# Patient Record
Sex: MALE | Race: WHITE | NOT HISPANIC OR LATINO | Employment: FULL TIME | URBAN - METROPOLITAN AREA
[De-identification: names, ages, dates, MRNs, and addresses within clinical notes are randomized per-mention and may not be internally consistent; named-entity substitution may affect disease eponyms.]

---

## 2019-02-28 LAB — HBA1C MFR BLD: 6.4 % OF TOTAL HGB

## 2019-03-05 ENCOUNTER — OFFICE VISIT (OUTPATIENT)
Dept: PRIMARY CARE CLINIC | Facility: CLINIC | Age: 59
End: 2019-03-05
Payer: COMMERCIAL

## 2019-03-05 VITALS
BODY MASS INDEX: 26.18 KG/M2 | DIASTOLIC BLOOD PRESSURE: 70 MMHG | WEIGHT: 166.81 LBS | OXYGEN SATURATION: 98 % | HEIGHT: 67 IN | SYSTOLIC BLOOD PRESSURE: 126 MMHG

## 2019-03-05 DIAGNOSIS — E11.9 TYPE 2 DIABETES MELLITUS WITHOUT COMPLICATION, WITHOUT LONG-TERM CURRENT USE OF INSULIN: Primary | ICD-10-CM

## 2019-03-05 DIAGNOSIS — I10 ESSENTIAL HYPERTENSION: ICD-10-CM

## 2019-03-05 PROBLEM — I73.9 PERIPHERAL VASCULAR DISEASE: Status: ACTIVE | Noted: 2018-09-18

## 2019-03-05 PROBLEM — Z87.442 HISTORY OF RENAL CALCULI: Status: ACTIVE | Noted: 2018-09-18

## 2019-03-05 PROBLEM — E78.2 MIXED HYPERLIPIDEMIA: Status: ACTIVE | Noted: 2019-03-05

## 2019-03-05 PROCEDURE — 3078F PR MOST RECENT DIASTOLIC BLOOD PRESSURE < 80 MM HG: ICD-10-PCS | Mod: CPTII,S$GLB,, | Performed by: FAMILY MEDICINE

## 2019-03-05 PROCEDURE — 99213 PR OFFICE/OUTPT VISIT, EST, LEVL III, 20-29 MIN: ICD-10-PCS | Mod: S$GLB,,, | Performed by: FAMILY MEDICINE

## 2019-03-05 PROCEDURE — 3008F PR BODY MASS INDEX (BMI) DOCUMENTED: ICD-10-PCS | Mod: CPTII,S$GLB,, | Performed by: FAMILY MEDICINE

## 2019-03-05 PROCEDURE — 99213 OFFICE O/P EST LOW 20 MIN: CPT | Mod: S$GLB,,, | Performed by: FAMILY MEDICINE

## 2019-03-05 PROCEDURE — 3074F PR MOST RECENT SYSTOLIC BLOOD PRESSURE < 130 MM HG: ICD-10-PCS | Mod: CPTII,S$GLB,, | Performed by: FAMILY MEDICINE

## 2019-03-05 PROCEDURE — 3078F DIAST BP <80 MM HG: CPT | Mod: CPTII,S$GLB,, | Performed by: FAMILY MEDICINE

## 2019-03-05 PROCEDURE — 3074F SYST BP LT 130 MM HG: CPT | Mod: CPTII,S$GLB,, | Performed by: FAMILY MEDICINE

## 2019-03-05 PROCEDURE — 3008F BODY MASS INDEX DOCD: CPT | Mod: CPTII,S$GLB,, | Performed by: FAMILY MEDICINE

## 2019-03-05 RX ORDER — AMLODIPINE BESYLATE 5 MG/1
5 TABLET ORAL DAILY
Refills: 3 | COMMUNITY
Start: 2019-02-18 | End: 2020-12-07 | Stop reason: SDUPTHER

## 2019-03-05 RX ORDER — ATORVASTATIN CALCIUM 40 MG/1
40 TABLET, FILM COATED ORAL DAILY
COMMUNITY
End: 2020-12-07 | Stop reason: SDUPTHER

## 2019-03-05 RX ORDER — ASPIRIN 81 MG/1
81 TABLET ORAL DAILY
Refills: 11 | COMMUNITY
Start: 2018-11-27 | End: 2019-05-30

## 2019-03-05 NOTE — PROGRESS NOTES
Subjective:       Patient ID: Mumtaz Levy is a 58 y.o. male.    Chief Complaint: Hypertension    Pt with newly dx'ed dm.  He has improved his diet after seeing the dietician.  He stopped sweet tea.  This has improved his glucose log.  We reviewed this in detail.  He has a1c pending.  He denies any se's from the tradjenta.  Also with hypertension.  He is currently on amlodipine.  He states his blood pressure has been good at home.  Also with renal insufficiency.  He saw Nephrology.  We reviewed his consult note.  He is happy with his current status and he has a follow-up appointment with blood work scheduled with him shortly.      Review of Systems   Constitutional: Negative for chills, fatigue, fever and unexpected weight change.   Eyes: Negative for visual disturbance.   Respiratory: Negative for shortness of breath.    Cardiovascular: Negative for chest pain, palpitations and leg swelling.   Gastrointestinal: Negative for abdominal pain, blood in stool, constipation, diarrhea, nausea and vomiting.   Genitourinary: Negative for dysuria and hematuria.   Musculoskeletal: Negative for arthralgias and back pain.   Skin: Negative for rash and wound.   Neurological: Negative for dizziness, tremors, syncope, weakness, light-headedness, numbness and headaches.   Hematological: Negative for adenopathy. Does not bruise/bleed easily.   Psychiatric/Behavioral: Negative for dysphoric mood. The patient is not nervous/anxious.        Objective:      Physical Exam   Constitutional: He appears well-developed and well-nourished.   HENT:   Head: Normocephalic and atraumatic.   Eyes: Conjunctivae and EOM are normal.   Neck: Neck supple. No thyromegaly present.   Cardiovascular: Normal rate, regular rhythm, normal heart sounds and intact distal pulses.   No murmur heard.  Pulmonary/Chest: Effort normal and breath sounds normal.   Abdominal: Soft. Bowel sounds are normal. He exhibits no mass. There is no tenderness. There is no  rebound and no guarding.   Musculoskeletal: Normal range of motion.   Neurological: He is alert.   Skin: Skin is dry.   Vitals reviewed.      Assessment:       1. Type 2 diabetes mellitus without complication, without long-term current use of insulin    2. Essential hypertension        Plan:       Type 2 diabetes mellitus without complication, without long-term current use of insulin    Essential hypertension              DISCUSSION:  After a thorough review of his blood glucose log and his A1c which is 6.4%, I recommended that he continue the diet and the medications.  His blood pressure is good and he is to continue to take the medication.  We will need blood work in the future.  I will see him in 6 months.  Continue follow-up with the nephrologist.

## 2019-03-12 DIAGNOSIS — E11.8 TYPE 2 DIABETES MELLITUS WITH COMPLICATION, WITHOUT LONG-TERM CURRENT USE OF INSULIN: Primary | ICD-10-CM

## 2019-03-15 ENCOUNTER — TELEPHONE (OUTPATIENT)
Dept: PRIMARY CARE CLINIC | Facility: CLINIC | Age: 59
End: 2019-03-15

## 2019-03-15 NOTE — TELEPHONE ENCOUNTER
----- Message from Navarro Thompson MD sent at 3/12/2019  3:23 PM CDT -----  Please let him know that his hemoglobin A1c is good at 6.4

## 2019-04-30 DIAGNOSIS — E11.9 TYPE 2 DIABETES MELLITUS WITHOUT COMPLICATION, WITHOUT LONG-TERM CURRENT USE OF INSULIN: Primary | ICD-10-CM

## 2019-05-10 DIAGNOSIS — E11.9 TYPE 2 DIABETES MELLITUS WITHOUT COMPLICATION, WITHOUT LONG-TERM CURRENT USE OF INSULIN: Primary | ICD-10-CM

## 2019-05-10 RX ORDER — LANCETS 33 GAUGE
1 EACH MISCELLANEOUS DAILY
COMMUNITY
End: 2019-05-10 | Stop reason: SDUPTHER

## 2019-05-13 RX ORDER — LANCETS 33 GAUGE
1 EACH MISCELLANEOUS DAILY
Qty: 300 EACH | Refills: 3 | Status: SHIPPED | OUTPATIENT
Start: 2019-05-13

## 2019-05-24 ENCOUNTER — TELEPHONE (OUTPATIENT)
Dept: PRIMARY CARE CLINIC | Facility: CLINIC | Age: 59
End: 2019-05-24

## 2019-05-24 NOTE — TELEPHONE ENCOUNTER
"Mr. Levy called to let me know that he is having shoulder pain that is going down his arm and he " needs help."     I called Mr. Levy back. He stated that it is on his right shoulder and radiating down his right arm. He did not hurt it. He stated that it did start suddenly.He denies any other symptoms at this time. He has not tried to take anything for this pain. I explained to him that Dr. Thompson is out of the office until [place] 3. I informed to try taking something for his pain like ibuprofen. I also told him to try something gas/ indigestion as this sometimes can cause severe pain.  He asked if it can cause it to radiate. I informed that it can. I told him to go to the ED or UC if the pain does not get any better and to keep us informed. I asked that he called back if his symptoms changed. He verbalized understanding and appreciation. He  did not have any other questions at this this time.   "

## 2019-05-30 ENCOUNTER — OFFICE VISIT (OUTPATIENT)
Dept: FAMILY MEDICINE | Facility: CLINIC | Age: 59
End: 2019-05-30
Payer: COMMERCIAL

## 2019-05-30 VITALS
SYSTOLIC BLOOD PRESSURE: 146 MMHG | HEART RATE: 70 BPM | OXYGEN SATURATION: 98 % | HEIGHT: 67 IN | DIASTOLIC BLOOD PRESSURE: 98 MMHG | BODY MASS INDEX: 26.53 KG/M2 | WEIGHT: 169 LBS

## 2019-05-30 DIAGNOSIS — M54.2 NECK PAIN ON RIGHT SIDE: ICD-10-CM

## 2019-05-30 DIAGNOSIS — M79.601 RIGHT ARM PAIN: ICD-10-CM

## 2019-05-30 DIAGNOSIS — M25.511 ACUTE PAIN OF RIGHT SHOULDER: Primary | ICD-10-CM

## 2019-05-30 PROCEDURE — 99214 OFFICE O/P EST MOD 30 MIN: CPT | Mod: S$GLB,,, | Performed by: NURSE PRACTITIONER

## 2019-05-30 PROCEDURE — 99214 PR OFFICE/OUTPT VISIT, EST, LEVL IV, 30-39 MIN: ICD-10-PCS | Mod: S$GLB,,, | Performed by: NURSE PRACTITIONER

## 2019-05-30 PROCEDURE — 3080F DIAST BP >= 90 MM HG: CPT | Mod: CPTII,S$GLB,, | Performed by: NURSE PRACTITIONER

## 2019-05-30 PROCEDURE — 3008F BODY MASS INDEX DOCD: CPT | Mod: CPTII,S$GLB,, | Performed by: NURSE PRACTITIONER

## 2019-05-30 PROCEDURE — 3080F PR MOST RECENT DIASTOLIC BLOOD PRESSURE >= 90 MM HG: ICD-10-PCS | Mod: CPTII,S$GLB,, | Performed by: NURSE PRACTITIONER

## 2019-05-30 PROCEDURE — 3077F SYST BP >= 140 MM HG: CPT | Mod: CPTII,S$GLB,, | Performed by: NURSE PRACTITIONER

## 2019-05-30 PROCEDURE — 3077F PR MOST RECENT SYSTOLIC BLOOD PRESSURE >= 140 MM HG: ICD-10-PCS | Mod: CPTII,S$GLB,, | Performed by: NURSE PRACTITIONER

## 2019-05-30 PROCEDURE — 3008F PR BODY MASS INDEX (BMI) DOCUMENTED: ICD-10-PCS | Mod: CPTII,S$GLB,, | Performed by: NURSE PRACTITIONER

## 2019-05-30 RX ORDER — TRAMADOL HYDROCHLORIDE 50 MG/1
50 TABLET ORAL EVERY 8 HOURS PRN
Qty: 20 TABLET | Refills: 0 | Status: SHIPPED | OUTPATIENT
Start: 2019-05-30 | End: 2021-11-10

## 2019-05-30 RX ORDER — NAPROXEN 500 MG/1
500 TABLET ORAL 2 TIMES DAILY
Qty: 30 TABLET | Refills: 0 | Status: SHIPPED | OUTPATIENT
Start: 2019-05-30 | End: 2019-06-14

## 2019-05-30 RX ORDER — ASPIRIN 81 MG/1
81 TABLET ORAL DAILY
COMMUNITY

## 2019-05-30 NOTE — PROGRESS NOTES
Subjective:       Patient ID: Mumtaz Levy is a 59 y.o. male.    Chief Complaint: Shoulder Pain (right)    Shoulder Pain    The pain is present in the neck, right hand, right arm and right shoulder. This is a new problem. The current episode started 1 to 4 weeks ago. There has been no history of extremity trauma. The problem occurs constantly (pain has become progressively more pain continuously in last 2 weeks). The problem has been rapidly worsening. The quality of the pain is described as burning, sharp and aching. The pain is at a severity of 10/10. The pain is severe. Associated symptoms include numbness and tingling. Pertinent negatives include no fever, headaches, inability to bear weight, itching, joint locking, joint swelling, limited range of motion, stiffness or visual symptoms. The symptoms are aggravated by activity. Treatments tried: message at  Message. The treatment provided no relief. Family history does not include arthritis. There is no history of diabetes, Injuries to Extremity or migraines.        Review of Systems   Constitutional: Negative for activity change, appetite change, chills, fatigue and fever.   Respiratory: Negative for apnea, cough, chest tightness, shortness of breath and wheezing.    Cardiovascular: Negative for chest pain, palpitations and leg swelling.   Gastrointestinal: Negative for abdominal distention, abdominal pain, blood in stool, constipation, diarrhea and vomiting.   Genitourinary: Negative for difficulty urinating, dysuria, flank pain, frequency and urgency.   Musculoskeletal: Positive for myalgias and neck pain. Negative for arthralgias, back pain, gait problem, joint swelling, neck stiffness and stiffness.        Right shoulder pain   Skin: Negative for color change, itching, pallor and rash.   Neurological: Positive for tingling and numbness. Negative for dizziness, tremors, syncope, weakness, light-headedness and headaches.   Hematological: Negative for  adenopathy. Does not bruise/bleed easily.   Psychiatric/Behavioral: Negative for agitation, confusion and sleep disturbance. The patient is not nervous/anxious.        Objective:      Physical Exam   Constitutional: He is oriented to person, place, and time. Vital signs are normal. He appears well-developed and well-nourished.   HENT:   Head: Normocephalic and atraumatic.   Mouth/Throat: Oropharynx is clear and moist.   Eyes: Pupils are equal, round, and reactive to light. Conjunctivae are normal.   Neck: Trachea normal and normal range of motion. Neck supple. Carotid bruit is not present.   Cardiovascular: Normal rate, regular rhythm and normal heart sounds.   Pulmonary/Chest: Effort normal and breath sounds normal.   Abdominal: Soft. Bowel sounds are normal.   Musculoskeletal: Normal range of motion.   ROM elicits pain in right side of posterior neck, but no palpable tenderness    ROM elicits pain in right posterior shoulder, but no palpable tenderness   Neurological: He is alert and oriented to person, place, and time.   Skin: Skin is warm, dry and intact.   Psychiatric: He has a normal mood and affect. His speech is normal and behavior is normal. Judgment normal.       Assessment:       1. Acute pain of right shoulder    2. Right arm pain    3. Neck pain on right side        Plan:       PROBLEM LIST     Mumtaz was seen today for shoulder pain.    Diagnoses and all orders for this visit:    Acute pain of right shoulder  -     X-ray Shoulder 2 or More Views Right; Future  -     X-ray Shoulder 2 or More Views Right  -     naproxen (NAPROSYN) 500 MG tablet; Take 1 tablet (500 mg total) by mouth 2 (two) times daily. for 15 days  -     traMADol (ULTRAM) 50 mg tablet; Take 1 tablet (50 mg total) by mouth every 8 (eight) hours as needed for Pain.    Right arm pain  -     X-Ray Cervical Spine 2 or 3 Views; Future  -     X-ray Shoulder 2 or More Views Right; Future  -     X-Ray Cervical Spine 2 or 3 Views  -     X-ray  Shoulder 2 or More Views Right    Neck pain on right side  -     X-Ray Cervical Spine 2 or 3 Views; Future  -     X-Ray Cervical Spine 2 or 3 Views  -     naproxen (NAPROSYN) 500 MG tablet; Take 1 tablet (500 mg total) by mouth 2 (two) times daily. for 15 days  -     traMADol (ULTRAM) 50 mg tablet; Take 1 tablet (50 mg total) by mouth every 8 (eight) hours as needed for Pain.    No recent injury that he can recall. Will get baseline imaging. Unclear of pain is originating from shoulder or neck. Given paresthesia complaints in right arm, I suspect this is a cervical issue. Start NSAID and RTC in 7-10 days for f/u.

## 2019-06-07 ENCOUNTER — OFFICE VISIT (OUTPATIENT)
Dept: FAMILY MEDICINE | Facility: CLINIC | Age: 59
End: 2019-06-07
Payer: COMMERCIAL

## 2019-06-07 VITALS
HEIGHT: 67 IN | SYSTOLIC BLOOD PRESSURE: 130 MMHG | BODY MASS INDEX: 26.61 KG/M2 | DIASTOLIC BLOOD PRESSURE: 82 MMHG | TEMPERATURE: 97 F | OXYGEN SATURATION: 98 % | WEIGHT: 169.56 LBS | HEART RATE: 66 BPM

## 2019-06-07 DIAGNOSIS — M25.511 ACUTE PAIN OF RIGHT SHOULDER: Primary | ICD-10-CM

## 2019-06-07 DIAGNOSIS — M79.601 RIGHT ARM PAIN: ICD-10-CM

## 2019-06-07 DIAGNOSIS — M54.2 NECK PAIN ON RIGHT SIDE: ICD-10-CM

## 2019-06-07 DIAGNOSIS — I10 ESSENTIAL HYPERTENSION: ICD-10-CM

## 2019-06-07 DIAGNOSIS — E11.9 TYPE 2 DIABETES MELLITUS WITHOUT COMPLICATION, WITHOUT LONG-TERM CURRENT USE OF INSULIN: ICD-10-CM

## 2019-06-07 PROCEDURE — 99213 PR OFFICE/OUTPT VISIT, EST, LEVL III, 20-29 MIN: ICD-10-PCS | Mod: S$GLB,,, | Performed by: NURSE PRACTITIONER

## 2019-06-07 PROCEDURE — 3079F PR MOST RECENT DIASTOLIC BLOOD PRESSURE 80-89 MM HG: ICD-10-PCS | Mod: CPTII,S$GLB,, | Performed by: NURSE PRACTITIONER

## 2019-06-07 PROCEDURE — 3075F PR MOST RECENT SYSTOLIC BLOOD PRESS GE 130-139MM HG: ICD-10-PCS | Mod: CPTII,S$GLB,, | Performed by: NURSE PRACTITIONER

## 2019-06-07 PROCEDURE — 3044F HG A1C LEVEL LT 7.0%: CPT | Mod: CPTII,S$GLB,, | Performed by: NURSE PRACTITIONER

## 2019-06-07 PROCEDURE — 3044F PR MOST RECENT HEMOGLOBIN A1C LEVEL <7.0%: ICD-10-PCS | Mod: CPTII,S$GLB,, | Performed by: NURSE PRACTITIONER

## 2019-06-07 PROCEDURE — 99213 OFFICE O/P EST LOW 20 MIN: CPT | Mod: S$GLB,,, | Performed by: NURSE PRACTITIONER

## 2019-06-07 PROCEDURE — 3008F BODY MASS INDEX DOCD: CPT | Mod: CPTII,S$GLB,, | Performed by: NURSE PRACTITIONER

## 2019-06-07 PROCEDURE — 3075F SYST BP GE 130 - 139MM HG: CPT | Mod: CPTII,S$GLB,, | Performed by: NURSE PRACTITIONER

## 2019-06-07 PROCEDURE — 3079F DIAST BP 80-89 MM HG: CPT | Mod: CPTII,S$GLB,, | Performed by: NURSE PRACTITIONER

## 2019-06-07 PROCEDURE — 3008F PR BODY MASS INDEX (BMI) DOCUMENTED: ICD-10-PCS | Mod: CPTII,S$GLB,, | Performed by: NURSE PRACTITIONER

## 2019-06-07 NOTE — PROGRESS NOTES
Subjective:       Patient ID: Mumtaz Levy is a 59 y.o. male.    Chief Complaint: shoulder pain    Shoulder Pain    The pain is present in the neck, right hand, right arm and right shoulder. This is a new problem. The current episode started 1 to 4 weeks ago. There has been no history of extremity trauma. The problem occurs constantly (pain has become progressively more pain continuously in last 2 weeks). The problem has been rapidly worsening. The quality of the pain is described as burning, sharp and aching. The pain is at a severity of 10/10. The pain is severe. Associated symptoms include numbness and tingling. Pertinent negatives include no fever, headaches, inability to bear weight, itching, joint locking, joint swelling, limited range of motion, stiffness or visual symptoms. The symptoms are aggravated by activity. Treatments tried: message at  Message. The treatment provided no relief. Family history does not include arthritis. There is no history of diabetes, Injuries to Extremity or migraines.        Review of Systems   Constitutional: Negative for activity change, appetite change, chills, fatigue and fever.   Respiratory: Negative for apnea, cough, chest tightness, shortness of breath and wheezing.    Cardiovascular: Negative for chest pain, palpitations and leg swelling.   Gastrointestinal: Negative for abdominal distention, abdominal pain, blood in stool, constipation, diarrhea and vomiting.   Genitourinary: Negative for difficulty urinating, dysuria, flank pain, frequency and urgency.   Musculoskeletal: Positive for myalgias and neck pain. Negative for arthralgias, back pain, gait problem, joint swelling, neck stiffness and stiffness.        Right shoulder pain   Skin: Negative for color change, itching, pallor and rash.   Neurological: Positive for tingling and numbness. Negative for dizziness, tremors, syncope, weakness, light-headedness and headaches.   Hematological: Negative for adenopathy.  Does not bruise/bleed easily.   Psychiatric/Behavioral: Negative for agitation, confusion and sleep disturbance. The patient is not nervous/anxious.        Objective:      Physical Exam   Constitutional: He is oriented to person, place, and time. Vital signs are normal. He appears well-developed and well-nourished.   HENT:   Head: Normocephalic and atraumatic.   Mouth/Throat: Oropharynx is clear and moist.   Eyes: Pupils are equal, round, and reactive to light. Conjunctivae are normal.   Neck: Trachea normal and normal range of motion. Neck supple. Carotid bruit is not present.   Cardiovascular: Normal rate, regular rhythm and normal heart sounds.   Pulmonary/Chest: Effort normal and breath sounds normal.   Abdominal: Soft. Bowel sounds are normal.   Musculoskeletal: Normal range of motion.   ROM elicits pain in right side of posterior neck, but no palpable tenderness    ROM elicits pain in right posterior shoulder, but no palpable tenderness   Neurological: He is alert and oriented to person, place, and time.   Skin: Skin is warm, dry and intact.   Psychiatric: He has a normal mood and affect. His speech is normal and behavior is normal. Judgment normal.       Assessment:       1. Acute pain of right shoulder    2. Right arm pain    3. Neck pain on right side    4. Type 2 diabetes mellitus without complication, without long-term current use of insulin    5. Essential hypertension        Plan:       PROBLEM KRIS Pandya was seen today for follow-up.    Diagnoses and all orders for this visit:    Acute pain of right shoulder  -     PT evaluation; Future    Right arm pain  -     PT evaluation; Future    Neck pain on right side  -     PT evaluation; Future    Type 2 diabetes mellitus without complication, without long-term current use of insulin    Essential hypertension    Both cervical xray and shoulder xray neg. Will continue NSAID and order PT. If no improvement w/ PT, MRI will be ordered.

## 2019-06-07 NOTE — PROGRESS NOTES
Subjective:       Patient ID: Mumtaz Levy is a 59 y.o. male.    Chief Complaint: No chief complaint on file.    HPI  Review of Systems    Objective:      Physical Exam    Assessment:       No diagnosis found.    Plan:       PROBLEM LIST     There are no diagnoses linked to this encounter.

## 2019-06-13 DIAGNOSIS — E11.9 TYPE 2 DIABETES MELLITUS WITHOUT COMPLICATION, WITHOUT LONG-TERM CURRENT USE OF INSULIN: ICD-10-CM

## 2019-06-13 RX ORDER — BLOOD-GLUCOSE METER
EACH MISCELLANEOUS
Refills: 0 | COMMUNITY
Start: 2019-03-13

## 2019-06-13 NOTE — TELEPHONE ENCOUNTER
Mr. Levy needs his  Linagliptin called into [facility] on [facility]. He has an apt on 09/11/2019 at 1000

## 2019-06-13 NOTE — TELEPHONE ENCOUNTER
"Mumtaz stated that he needs his " sugar medicine" filled. He has Linagliptin on file. I tried to call him to confirm this was the medication he needed but there was no answer.      According to his chart, the Linagliptin was called in on 04/30/2019 to [facility] for a 90 day refill with 3 additional refills. He should still have medication left.   "

## 2019-09-11 ENCOUNTER — OFFICE VISIT (OUTPATIENT)
Dept: PRIMARY CARE CLINIC | Facility: CLINIC | Age: 59
End: 2019-09-11
Payer: COMMERCIAL

## 2019-09-11 VITALS
WEIGHT: 168 LBS | OXYGEN SATURATION: 95 % | HEART RATE: 64 BPM | HEIGHT: 67 IN | SYSTOLIC BLOOD PRESSURE: 122 MMHG | RESPIRATION RATE: 14 BRPM | BODY MASS INDEX: 26.37 KG/M2 | DIASTOLIC BLOOD PRESSURE: 80 MMHG

## 2019-09-11 DIAGNOSIS — E78.2 MIXED HYPERLIPIDEMIA: ICD-10-CM

## 2019-09-11 DIAGNOSIS — I73.9 PERIPHERAL VASCULAR DISEASE: ICD-10-CM

## 2019-09-11 DIAGNOSIS — E11.9 TYPE 2 DIABETES MELLITUS WITHOUT COMPLICATION, WITHOUT LONG-TERM CURRENT USE OF INSULIN: ICD-10-CM

## 2019-09-11 DIAGNOSIS — I10 ESSENTIAL HYPERTENSION: Primary | ICD-10-CM

## 2019-09-11 DIAGNOSIS — Z12.11 SCREENING FOR MALIGNANT NEOPLASM OF COLON: ICD-10-CM

## 2019-09-11 PROCEDURE — 99214 PR OFFICE/OUTPT VISIT, EST, LEVL IV, 30-39 MIN: ICD-10-PCS | Mod: S$GLB,,, | Performed by: FAMILY MEDICINE

## 2019-09-11 PROCEDURE — 3008F PR BODY MASS INDEX (BMI) DOCUMENTED: ICD-10-PCS | Mod: CPTII,S$GLB,, | Performed by: FAMILY MEDICINE

## 2019-09-11 PROCEDURE — 3079F PR MOST RECENT DIASTOLIC BLOOD PRESSURE 80-89 MM HG: ICD-10-PCS | Mod: CPTII,S$GLB,, | Performed by: FAMILY MEDICINE

## 2019-09-11 PROCEDURE — 3044F HG A1C LEVEL LT 7.0%: CPT | Mod: CPTII,S$GLB,, | Performed by: FAMILY MEDICINE

## 2019-09-11 PROCEDURE — 99214 OFFICE O/P EST MOD 30 MIN: CPT | Mod: S$GLB,,, | Performed by: FAMILY MEDICINE

## 2019-09-11 PROCEDURE — 3044F PR MOST RECENT HEMOGLOBIN A1C LEVEL <7.0%: ICD-10-PCS | Mod: CPTII,S$GLB,, | Performed by: FAMILY MEDICINE

## 2019-09-11 PROCEDURE — 3079F DIAST BP 80-89 MM HG: CPT | Mod: CPTII,S$GLB,, | Performed by: FAMILY MEDICINE

## 2019-09-11 PROCEDURE — 3074F SYST BP LT 130 MM HG: CPT | Mod: CPTII,S$GLB,, | Performed by: FAMILY MEDICINE

## 2019-09-11 PROCEDURE — 3074F PR MOST RECENT SYSTOLIC BLOOD PRESSURE < 130 MM HG: ICD-10-PCS | Mod: CPTII,S$GLB,, | Performed by: FAMILY MEDICINE

## 2019-09-11 PROCEDURE — 3008F BODY MASS INDEX DOCD: CPT | Mod: CPTII,S$GLB,, | Performed by: FAMILY MEDICINE

## 2019-09-11 NOTE — PROGRESS NOTES
Subjective:       Patient ID: Mumtaz Levy is a 59 y.o. male.    Chief Complaint: Follow-up    Patient with peripheral arterial disease and femoral bypass last year along with newly diagnosed diabetes presents for follow-up of his chronic conditions.  He states he has been feeling well.  He still has not had his colonoscopy.  He states he saw a GI doctor but nothing was ever scheduled (?).  He has his glucose log and it actually looks fairly well.  He has made large changes in his diet.  He is active at work but does not exercise specifically.  He denies any problems from the surgery.  Also with hypertension.  This has been perfectly controlled on medications without side effects.  Also with mixed hyperlipidemia.  This has been well controlled on 40 mg of atorvastatin.  He also takes that for the above.  He states currently he is in his normal state of health.  His last blood work was 6 months ago.        Review of Systems   Constitutional: Negative for chills, fatigue, fever and unexpected weight change.   Eyes: Negative for visual disturbance.   Respiratory: Negative for shortness of breath.    Cardiovascular: Negative for chest pain, palpitations and leg swelling.   Gastrointestinal: Negative for abdominal pain, blood in stool, constipation, diarrhea, nausea and vomiting.   Genitourinary: Negative for dysuria and hematuria.   Musculoskeletal: Negative for arthralgias and back pain.   Skin: Negative for rash and wound.   Neurological: Negative for dizziness, tremors, syncope, weakness, light-headedness, numbness and headaches.   Hematological: Negative for adenopathy. Does not bruise/bleed easily.   Psychiatric/Behavioral: Negative for dysphoric mood. The patient is not nervous/anxious.      Medication List with Changes/Refills   Current Medications    AMLODIPINE (NORVASC) 5 MG TABLET    Take 5 mg by mouth once daily.    ASPIRIN 325 MG TABLET    Take 325 mg by mouth once daily.    ATORVASTATIN (LIPITOR) 40 MG  "TABLET    Take 40 mg by mouth once daily.    BLOOD SUGAR DIAGNOSTIC (BLOOD GLUCOSE TEST) STRP    Place 1 strip onto the skin once daily.    LANCETS (MICRO THIN LANCETS) 33 GAUGE MISC    1 lancet by Misc.(Non-Drug; Combo Route) route once daily.    LINAGLIPTIN (TRADJENTA) 5 MG TAB TABLET    Take 1 tablet (5 mg total) by mouth once daily.    TRAMADOL (ULTRAM) 50 MG TABLET    Take 1 tablet (50 mg total) by mouth every 8 (eight) hours as needed for Pain.    TRUE METRIX GLUCOSE METER MISC    USE TO TEST BLOOD SUGAR DAILY AS DIRECTED         Objective:      /80   Pulse 64   Resp 14   Ht 5' 7" (1.702 m)   Wt 76.2 kg (168 lb)   SpO2 95%   BMI 26.31 kg/m²   Estimated body mass index is 26.31 kg/m² as calculated from the following:    Height as of this encounter: 5' 7" (1.702 m).    Weight as of this encounter: 76.2 kg (168 lb).  Physical Exam   Constitutional: He appears well-developed and well-nourished.   HENT:   Head: Normocephalic and atraumatic.   Eyes: Conjunctivae and EOM are normal.   Neck: Neck supple. No thyromegaly present.   Cardiovascular: Normal rate, regular rhythm, normal heart sounds and intact distal pulses.   No murmur heard.  Pulmonary/Chest: Effort normal and breath sounds normal.   Abdominal: Soft. Bowel sounds are normal. He exhibits no mass. There is no tenderness. There is no rebound and no guarding.   Musculoskeletal: Normal range of motion.   Neurological: He is alert.   Skin: Skin is dry.   Vitals reviewed.      Assessment:       Problem List Items Addressed This Visit        Cardiac/Vascular    Peripheral vascular disease    Mixed hyperlipidemia    Relevant Orders    Lipid panel    Essential hypertension - Primary    Relevant Orders    CBC auto differential    Comprehensive metabolic panel       Endocrine    Type 2 diabetes mellitus without complication, without long-term current use of insulin    Relevant Orders    Hemoglobin A1c            Plan:       Essential hypertension  -     " CBC auto differential; Future; Expected date: 09/11/2019  -     Comprehensive metabolic panel; Future; Expected date: 09/11/2019    Mixed hyperlipidemia  -     Lipid panel; Future; Expected date: 09/11/2019    Peripheral vascular disease    Type 2 diabetes mellitus without complication, without long-term current use of insulin  -     Hemoglobin A1c; Future; Expected date: 09/11/2019              DISCUSSION:  Get labs.  Cont meds.  Cont healthy diet and try to improve 5 % more.  Never had a c scope.  Yearly eye exam was good.

## 2019-09-12 LAB
ALBUMIN SERPL-MCNC: 4.4 G/DL (ref 3.6–5.1)
ALBUMIN/GLOB SERPL: 1.6 (CALC) (ref 1–2.5)
ALP SERPL-CCNC: 98 U/L (ref 40–115)
ALT SERPL-CCNC: 29 U/L (ref 9–46)
AST SERPL-CCNC: 15 U/L (ref 10–35)
BASOPHILS # BLD AUTO: 52 CELLS/UL (ref 0–200)
BASOPHILS NFR BLD AUTO: 0.6 %
BILIRUB SERPL-MCNC: 0.4 MG/DL (ref 0.2–1.2)
BUN SERPL-MCNC: 30 MG/DL (ref 7–25)
BUN/CREAT SERPL: 19 (CALC) (ref 6–22)
CALCIUM SERPL-MCNC: 10.1 MG/DL (ref 8.6–10.3)
CHLORIDE SERPL-SCNC: 105 MMOL/L (ref 98–110)
CHOLEST SERPL-MCNC: 121 MG/DL
CHOLEST/HDLC SERPL: 3.6 (CALC)
CO2 SERPL-SCNC: 26 MMOL/L (ref 20–32)
CREAT SERPL-MCNC: 1.57 MG/DL (ref 0.7–1.33)
EOSINOPHIL # BLD AUTO: 120 CELLS/UL (ref 15–500)
EOSINOPHIL NFR BLD AUTO: 1.4 %
ERYTHROCYTE [DISTWIDTH] IN BLOOD BY AUTOMATED COUNT: 12.8 % (ref 11–15)
GFRSERPLBLD MDRD-ARVRAT: 48 ML/MIN/1.73M2
GLOBULIN SER CALC-MCNC: 2.8 G/DL (CALC) (ref 1.9–3.7)
GLUCOSE SERPL-MCNC: 179 MG/DL (ref 65–139)
HBA1C MFR BLD: 6.7 % OF TOTAL HGB
HCT VFR BLD AUTO: 41.8 % (ref 38.5–50)
HDLC SERPL-MCNC: 34 MG/DL
HGB BLD-MCNC: 13.9 G/DL (ref 13.2–17.1)
LDLC SERPL CALC-MCNC: 63 MG/DL (CALC)
LYMPHOCYTES # BLD AUTO: 2743 CELLS/UL (ref 850–3900)
LYMPHOCYTES NFR BLD AUTO: 31.9 %
MCH RBC QN AUTO: 29.4 PG (ref 27–33)
MCHC RBC AUTO-ENTMCNC: 33.3 G/DL (ref 32–36)
MCV RBC AUTO: 88.4 FL (ref 80–100)
MONOCYTES # BLD AUTO: 628 CELLS/UL (ref 200–950)
MONOCYTES NFR BLD AUTO: 7.3 %
NEUTROPHILS # BLD AUTO: 5057 CELLS/UL (ref 1500–7800)
NEUTROPHILS NFR BLD AUTO: 58.8 %
NONHDLC SERPL-MCNC: 87 MG/DL (CALC)
PLATELET # BLD AUTO: 235 THOUSAND/UL (ref 140–400)
PMV BLD REES-ECKER: 9.9 FL (ref 7.5–12.5)
POTASSIUM SERPL-SCNC: 4.1 MMOL/L (ref 3.5–5.3)
PROT SERPL-MCNC: 7.2 G/DL (ref 6.1–8.1)
RBC # BLD AUTO: 4.73 MILLION/UL (ref 4.2–5.8)
SODIUM SERPL-SCNC: 141 MMOL/L (ref 135–146)
TRIGL SERPL-MCNC: 164 MG/DL
WBC # BLD AUTO: 8.6 THOUSAND/UL (ref 3.8–10.8)

## 2019-09-19 ENCOUNTER — TELEPHONE (OUTPATIENT)
Dept: PRIMARY CARE CLINIC | Facility: CLINIC | Age: 59
End: 2019-09-19

## 2019-09-19 NOTE — TELEPHONE ENCOUNTER
----- Message from Navarro Thompson MD sent at 9/19/2019  1:37 PM CDT -----  Please let him know that his kidneys are little deficient still.  Make sure he drinks enough water.  The rest of his blood work looks okay.  His blood sugar is a little bit higher but still in the good range.  Continue to eat healthy is diet that he can eat.  I would like for him to follow up in 6 months.

## 2020-04-03 DIAGNOSIS — E11.8 TYPE 2 DIABETES MELLITUS WITH COMPLICATION, WITHOUT LONG-TERM CURRENT USE OF INSULIN: ICD-10-CM

## 2020-04-06 RX ORDER — CALCIUM CITRATE/VITAMIN D3 200MG-6.25
TABLET ORAL
Qty: 300 STRIP | Refills: 3 | Status: SHIPPED | OUTPATIENT
Start: 2020-04-06 | End: 2021-05-11 | Stop reason: SDUPTHER

## 2020-12-09 RX ORDER — ATORVASTATIN CALCIUM 40 MG/1
40 TABLET, FILM COATED ORAL DAILY
Qty: 90 TABLET | Refills: 0 | Status: SHIPPED | OUTPATIENT
Start: 2020-12-09 | End: 2021-11-10

## 2020-12-09 RX ORDER — AMLODIPINE BESYLATE 5 MG/1
5 TABLET ORAL DAILY
Qty: 90 TABLET | Refills: 0 | Status: SHIPPED | OUTPATIENT
Start: 2020-12-09 | End: 2021-11-10

## 2021-03-31 ENCOUNTER — PATIENT OUTREACH (OUTPATIENT)
Dept: ADMINISTRATIVE | Facility: HOSPITAL | Age: 61
End: 2021-03-31

## 2021-04-08 ENCOUNTER — OFFICE VISIT (OUTPATIENT)
Dept: PRIMARY CARE CLINIC | Facility: CLINIC | Age: 61
End: 2021-04-08
Payer: COMMERCIAL

## 2021-04-08 VITALS
DIASTOLIC BLOOD PRESSURE: 78 MMHG | BODY MASS INDEX: 26.68 KG/M2 | OXYGEN SATURATION: 98 % | RESPIRATION RATE: 18 BRPM | HEIGHT: 67 IN | HEART RATE: 63 BPM | WEIGHT: 170 LBS | SYSTOLIC BLOOD PRESSURE: 136 MMHG

## 2021-04-08 DIAGNOSIS — Z00.00 WELLNESS EXAMINATION: Primary | ICD-10-CM

## 2021-04-08 DIAGNOSIS — E78.2 MIXED HYPERLIPIDEMIA: ICD-10-CM

## 2021-04-08 DIAGNOSIS — I10 ESSENTIAL HYPERTENSION: ICD-10-CM

## 2021-04-08 DIAGNOSIS — E11.9 TYPE 2 DIABETES MELLITUS WITHOUT COMPLICATION, WITHOUT LONG-TERM CURRENT USE OF INSULIN: ICD-10-CM

## 2021-04-08 DIAGNOSIS — I73.9 PERIPHERAL VASCULAR DISEASE: ICD-10-CM

## 2021-04-08 PROCEDURE — 99396 PR PREVENTIVE VISIT,EST,40-64: ICD-10-PCS | Mod: S$GLB,,, | Performed by: FAMILY MEDICINE

## 2021-04-08 PROCEDURE — 3008F BODY MASS INDEX DOCD: CPT | Mod: CPTII,S$GLB,, | Performed by: FAMILY MEDICINE

## 2021-04-08 PROCEDURE — 3008F PR BODY MASS INDEX (BMI) DOCUMENTED: ICD-10-PCS | Mod: CPTII,S$GLB,, | Performed by: FAMILY MEDICINE

## 2021-04-08 PROCEDURE — 99396 PREV VISIT EST AGE 40-64: CPT | Mod: S$GLB,,, | Performed by: FAMILY MEDICINE

## 2021-04-09 LAB
ALBUMIN SERPL-MCNC: 4.3 G/DL (ref 3.6–5.1)
ALBUMIN/GLOB SERPL: 1.4 (CALC) (ref 1–2.5)
ALP SERPL-CCNC: 77 U/L (ref 35–144)
ALT SERPL-CCNC: 33 U/L (ref 9–46)
AST SERPL-CCNC: 17 U/L (ref 10–35)
BASOPHILS # BLD AUTO: 39 CELLS/UL (ref 0–200)
BASOPHILS NFR BLD AUTO: 0.5 %
BILIRUB SERPL-MCNC: 0.5 MG/DL (ref 0.2–1.2)
BUN SERPL-MCNC: 27 MG/DL (ref 7–25)
BUN/CREAT SERPL: 18 (CALC) (ref 6–22)
CALCIUM SERPL-MCNC: 9.4 MG/DL (ref 8.6–10.3)
CHLORIDE SERPL-SCNC: 104 MMOL/L (ref 98–110)
CHOLEST SERPL-MCNC: 137 MG/DL
CHOLEST/HDLC SERPL: 3.9 (CALC)
CO2 SERPL-SCNC: 28 MMOL/L (ref 20–32)
CREAT SERPL-MCNC: 1.51 MG/DL (ref 0.7–1.25)
EOSINOPHIL # BLD AUTO: 94 CELLS/UL (ref 15–500)
EOSINOPHIL NFR BLD AUTO: 1.2 %
ERYTHROCYTE [DISTWIDTH] IN BLOOD BY AUTOMATED COUNT: 13.1 % (ref 11–15)
GFRSERPLBLD MDRD-ARVRAT: 49 ML/MIN/1.73M2
GLOBULIN SER CALC-MCNC: 3 G/DL (CALC) (ref 1.9–3.7)
GLUCOSE SERPL-MCNC: 123 MG/DL (ref 65–99)
HBA1C MFR BLD: 6.6 % OF TOTAL HGB
HCT VFR BLD AUTO: 43.9 % (ref 38.5–50)
HDLC SERPL-MCNC: 35 MG/DL
HGB BLD-MCNC: 15.3 G/DL (ref 13.2–17.1)
LDLC SERPL CALC-MCNC: 76 MG/DL (CALC)
LYMPHOCYTES # BLD AUTO: 2527 CELLS/UL (ref 850–3900)
LYMPHOCYTES NFR BLD AUTO: 32.4 %
MCH RBC QN AUTO: 30.8 PG (ref 27–33)
MCHC RBC AUTO-ENTMCNC: 34.9 G/DL (ref 32–36)
MCV RBC AUTO: 88.5 FL (ref 80–100)
MONOCYTES # BLD AUTO: 632 CELLS/UL (ref 200–950)
MONOCYTES NFR BLD AUTO: 8.1 %
NEUTROPHILS # BLD AUTO: 4508 CELLS/UL (ref 1500–7800)
NEUTROPHILS NFR BLD AUTO: 57.8 %
NONHDLC SERPL-MCNC: 102 MG/DL (CALC)
PLATELET # BLD AUTO: 231 THOUSAND/UL (ref 140–400)
PMV BLD REES-ECKER: 10.1 FL (ref 7.5–12.5)
POTASSIUM SERPL-SCNC: 4.3 MMOL/L (ref 3.5–5.3)
PROT SERPL-MCNC: 7.3 G/DL (ref 6.1–8.1)
RBC # BLD AUTO: 4.96 MILLION/UL (ref 4.2–5.8)
SODIUM SERPL-SCNC: 139 MMOL/L (ref 135–146)
TRIGL SERPL-MCNC: 162 MG/DL
TSH SERPL-ACNC: 3.89 MIU/L (ref 0.4–4.5)
WBC # BLD AUTO: 7.8 THOUSAND/UL (ref 3.8–10.8)

## 2021-04-13 ENCOUNTER — TELEPHONE (OUTPATIENT)
Dept: PRIMARY CARE CLINIC | Facility: CLINIC | Age: 61
End: 2021-04-13

## 2021-05-11 DIAGNOSIS — E11.8 TYPE 2 DIABETES MELLITUS WITH COMPLICATION, WITHOUT LONG-TERM CURRENT USE OF INSULIN: ICD-10-CM

## 2021-05-11 RX ORDER — CALCIUM CITRATE/VITAMIN D3 200MG-6.25
TABLET ORAL
Qty: 300 STRIP | Refills: 3 | Status: SHIPPED | OUTPATIENT
Start: 2021-05-11 | End: 2021-11-10 | Stop reason: SDUPTHER

## 2021-11-10 ENCOUNTER — OFFICE VISIT (OUTPATIENT)
Dept: PRIMARY CARE CLINIC | Facility: CLINIC | Age: 61
End: 2021-11-10
Payer: COMMERCIAL

## 2021-11-10 VITALS
HEART RATE: 76 BPM | OXYGEN SATURATION: 98 % | BODY MASS INDEX: 27 KG/M2 | DIASTOLIC BLOOD PRESSURE: 81 MMHG | RESPIRATION RATE: 18 BRPM | HEIGHT: 67 IN | WEIGHT: 172 LBS | SYSTOLIC BLOOD PRESSURE: 137 MMHG

## 2021-11-10 DIAGNOSIS — E11.9 TYPE 2 DIABETES MELLITUS WITHOUT COMPLICATION, WITHOUT LONG-TERM CURRENT USE OF INSULIN: Primary | ICD-10-CM

## 2021-11-10 DIAGNOSIS — E78.2 MIXED HYPERLIPIDEMIA: ICD-10-CM

## 2021-11-10 DIAGNOSIS — I10 ESSENTIAL HYPERTENSION: ICD-10-CM

## 2021-11-10 DIAGNOSIS — I73.9 PERIPHERAL VASCULAR DISEASE: ICD-10-CM

## 2021-11-10 PROCEDURE — 3079F DIAST BP 80-89 MM HG: CPT | Mod: CPTII,S$GLB,, | Performed by: FAMILY MEDICINE

## 2021-11-10 PROCEDURE — 3079F PR MOST RECENT DIASTOLIC BLOOD PRESSURE 80-89 MM HG: ICD-10-PCS | Mod: CPTII,S$GLB,, | Performed by: FAMILY MEDICINE

## 2021-11-10 PROCEDURE — 3008F BODY MASS INDEX DOCD: CPT | Mod: CPTII,S$GLB,, | Performed by: FAMILY MEDICINE

## 2021-11-10 PROCEDURE — 1160F RVW MEDS BY RX/DR IN RCRD: CPT | Mod: CPTII,S$GLB,, | Performed by: FAMILY MEDICINE

## 2021-11-10 PROCEDURE — 99214 PR OFFICE/OUTPT VISIT, EST, LEVL IV, 30-39 MIN: ICD-10-PCS | Mod: S$GLB,,, | Performed by: FAMILY MEDICINE

## 2021-11-10 PROCEDURE — 3075F SYST BP GE 130 - 139MM HG: CPT | Mod: CPTII,S$GLB,, | Performed by: FAMILY MEDICINE

## 2021-11-10 PROCEDURE — 99214 OFFICE O/P EST MOD 30 MIN: CPT | Mod: S$GLB,,, | Performed by: FAMILY MEDICINE

## 2021-11-10 PROCEDURE — 1160F PR REVIEW ALL MEDS BY PRESCRIBER/CLIN PHARMACIST DOCUMENTED: ICD-10-PCS | Mod: CPTII,S$GLB,, | Performed by: FAMILY MEDICINE

## 2021-11-10 PROCEDURE — 3044F PR MOST RECENT HEMOGLOBIN A1C LEVEL <7.0%: ICD-10-PCS | Mod: CPTII,S$GLB,, | Performed by: FAMILY MEDICINE

## 2021-11-10 PROCEDURE — 3075F PR MOST RECENT SYSTOLIC BLOOD PRESS GE 130-139MM HG: ICD-10-PCS | Mod: CPTII,S$GLB,, | Performed by: FAMILY MEDICINE

## 2021-11-10 PROCEDURE — 3044F HG A1C LEVEL LT 7.0%: CPT | Mod: CPTII,S$GLB,, | Performed by: FAMILY MEDICINE

## 2021-11-10 PROCEDURE — 3008F PR BODY MASS INDEX (BMI) DOCUMENTED: ICD-10-PCS | Mod: CPTII,S$GLB,, | Performed by: FAMILY MEDICINE

## 2021-11-10 PROCEDURE — 1159F PR MEDICATION LIST DOCUMENTED IN MEDICAL RECORD: ICD-10-PCS | Mod: CPTII,S$GLB,, | Performed by: FAMILY MEDICINE

## 2021-11-10 PROCEDURE — 1159F MED LIST DOCD IN RCRD: CPT | Mod: CPTII,S$GLB,, | Performed by: FAMILY MEDICINE

## 2021-11-10 RX ORDER — LINAGLIPTIN 5 MG/1
5 TABLET, FILM COATED ORAL DAILY
Qty: 30 TABLET | Refills: 11 | Status: SHIPPED | OUTPATIENT
Start: 2021-11-10 | End: 2022-03-30 | Stop reason: SDUPTHER

## 2021-11-12 LAB
ALBUMIN SERPL-MCNC: 4.3 G/DL (ref 3.6–5.1)
ALBUMIN/GLOB SERPL: 1.4 (CALC) (ref 1–2.5)
ALP SERPL-CCNC: 78 U/L (ref 35–144)
ALT SERPL-CCNC: 32 U/L (ref 9–46)
AST SERPL-CCNC: 15 U/L (ref 10–35)
BILIRUB SERPL-MCNC: 0.5 MG/DL (ref 0.2–1.2)
BUN SERPL-MCNC: 34 MG/DL (ref 7–25)
BUN/CREAT SERPL: 22 (CALC) (ref 6–22)
CALCIUM SERPL-MCNC: 9.2 MG/DL (ref 8.6–10.3)
CHLORIDE SERPL-SCNC: 104 MMOL/L (ref 98–110)
CHOLEST SERPL-MCNC: 194 MG/DL
CHOLEST/HDLC SERPL: 5.4 (CALC)
CO2 SERPL-SCNC: 21 MMOL/L (ref 20–32)
CREAT SERPL-MCNC: 1.55 MG/DL (ref 0.7–1.25)
GLOBULIN SER CALC-MCNC: 3 G/DL (CALC) (ref 1.9–3.7)
GLUCOSE SERPL-MCNC: 164 MG/DL (ref 65–99)
HBA1C MFR BLD: 8.4 % OF TOTAL HGB
HDLC SERPL-MCNC: 36 MG/DL
LDLC SERPL CALC-MCNC: 127 MG/DL (CALC)
NONHDLC SERPL-MCNC: 158 MG/DL (CALC)
POTASSIUM SERPL-SCNC: 4.5 MMOL/L (ref 3.5–5.3)
PROT SERPL-MCNC: 7.3 G/DL (ref 6.1–8.1)
SODIUM SERPL-SCNC: 137 MMOL/L (ref 135–146)
TRIGL SERPL-MCNC: 193 MG/DL

## 2021-11-15 RX ORDER — ATORVASTATIN CALCIUM 40 MG/1
40 TABLET, FILM COATED ORAL DAILY
Qty: 90 TABLET | Refills: 0 | Status: SHIPPED | OUTPATIENT
Start: 2021-11-15 | End: 2022-03-30 | Stop reason: SDUPTHER

## 2021-11-15 RX ORDER — METFORMIN HYDROCHLORIDE 500 MG/1
250 TABLET ORAL 2 TIMES DAILY WITH MEALS
Qty: 30 TABLET | Refills: 11 | Status: SHIPPED | OUTPATIENT
Start: 2021-11-15 | End: 2022-05-10

## 2021-11-29 ENCOUNTER — PATIENT OUTREACH (OUTPATIENT)
Dept: ADMINISTRATIVE | Facility: HOSPITAL | Age: 61
End: 2021-11-29
Payer: COMMERCIAL

## 2022-03-30 DIAGNOSIS — E11.9 TYPE 2 DIABETES MELLITUS WITHOUT COMPLICATION, WITHOUT LONG-TERM CURRENT USE OF INSULIN: ICD-10-CM

## 2022-03-30 DIAGNOSIS — E78.2 MIXED HYPERLIPIDEMIA: ICD-10-CM

## 2022-03-31 RX ORDER — LINAGLIPTIN 5 MG/1
5 TABLET, FILM COATED ORAL DAILY
Qty: 30 TABLET | Refills: 11 | Status: SHIPPED | OUTPATIENT
Start: 2022-03-31 | End: 2023-06-22 | Stop reason: SDUPTHER

## 2022-03-31 RX ORDER — ATORVASTATIN CALCIUM 40 MG/1
40 TABLET, FILM COATED ORAL DAILY
Qty: 90 TABLET | Refills: 3 | Status: SHIPPED | OUTPATIENT
Start: 2022-03-31 | End: 2023-06-22 | Stop reason: SDUPTHER

## 2022-05-10 ENCOUNTER — PATIENT MESSAGE (OUTPATIENT)
Dept: ADMINISTRATIVE | Facility: HOSPITAL | Age: 62
End: 2022-05-10
Payer: COMMERCIAL

## 2022-05-10 ENCOUNTER — OFFICE VISIT (OUTPATIENT)
Dept: PRIMARY CARE CLINIC | Facility: CLINIC | Age: 62
End: 2022-05-10
Payer: COMMERCIAL

## 2022-05-10 VITALS
DIASTOLIC BLOOD PRESSURE: 79 MMHG | OXYGEN SATURATION: 100 % | HEART RATE: 60 BPM | SYSTOLIC BLOOD PRESSURE: 145 MMHG | HEIGHT: 67 IN | WEIGHT: 173 LBS | BODY MASS INDEX: 27.15 KG/M2

## 2022-05-10 DIAGNOSIS — E11.9 TYPE 2 DIABETES MELLITUS WITHOUT COMPLICATION, WITHOUT LONG-TERM CURRENT USE OF INSULIN: Primary | ICD-10-CM

## 2022-05-10 DIAGNOSIS — N18.2 STAGE 2 CHRONIC KIDNEY DISEASE: ICD-10-CM

## 2022-05-10 DIAGNOSIS — Z12.11 SCREENING FOR COLON CANCER: ICD-10-CM

## 2022-05-10 DIAGNOSIS — E78.2 MIXED HYPERLIPIDEMIA: ICD-10-CM

## 2022-05-10 DIAGNOSIS — I10 ESSENTIAL HYPERTENSION: ICD-10-CM

## 2022-05-10 PROCEDURE — 3008F BODY MASS INDEX DOCD: CPT | Mod: CPTII,S$GLB,, | Performed by: FAMILY MEDICINE

## 2022-05-10 PROCEDURE — 3077F PR MOST RECENT SYSTOLIC BLOOD PRESSURE >= 140 MM HG: ICD-10-PCS | Mod: CPTII,S$GLB,, | Performed by: FAMILY MEDICINE

## 2022-05-10 PROCEDURE — 1160F PR REVIEW ALL MEDS BY PRESCRIBER/CLIN PHARMACIST DOCUMENTED: ICD-10-PCS | Mod: CPTII,S$GLB,, | Performed by: FAMILY MEDICINE

## 2022-05-10 PROCEDURE — 99214 PR OFFICE/OUTPT VISIT, EST, LEVL IV, 30-39 MIN: ICD-10-PCS | Mod: S$GLB,,, | Performed by: FAMILY MEDICINE

## 2022-05-10 PROCEDURE — 1160F RVW MEDS BY RX/DR IN RCRD: CPT | Mod: CPTII,S$GLB,, | Performed by: FAMILY MEDICINE

## 2022-05-10 PROCEDURE — 3078F DIAST BP <80 MM HG: CPT | Mod: CPTII,S$GLB,, | Performed by: FAMILY MEDICINE

## 2022-05-10 PROCEDURE — 3052F HG A1C>EQUAL 8.0%<EQUAL 9.0%: CPT | Mod: CPTII,S$GLB,, | Performed by: FAMILY MEDICINE

## 2022-05-10 PROCEDURE — 3078F PR MOST RECENT DIASTOLIC BLOOD PRESSURE < 80 MM HG: ICD-10-PCS | Mod: CPTII,S$GLB,, | Performed by: FAMILY MEDICINE

## 2022-05-10 PROCEDURE — 99214 OFFICE O/P EST MOD 30 MIN: CPT | Mod: S$GLB,,, | Performed by: FAMILY MEDICINE

## 2022-05-10 PROCEDURE — 1159F PR MEDICATION LIST DOCUMENTED IN MEDICAL RECORD: ICD-10-PCS | Mod: CPTII,S$GLB,, | Performed by: FAMILY MEDICINE

## 2022-05-10 PROCEDURE — 3077F SYST BP >= 140 MM HG: CPT | Mod: CPTII,S$GLB,, | Performed by: FAMILY MEDICINE

## 2022-05-10 PROCEDURE — 1159F MED LIST DOCD IN RCRD: CPT | Mod: CPTII,S$GLB,, | Performed by: FAMILY MEDICINE

## 2022-05-10 PROCEDURE — 3052F PR MOST RECENT HEMOGLOBIN A1C LEVEL 8.0 - < 9.0%: ICD-10-PCS | Mod: CPTII,S$GLB,, | Performed by: FAMILY MEDICINE

## 2022-05-10 PROCEDURE — 3008F PR BODY MASS INDEX (BMI) DOCUMENTED: ICD-10-PCS | Mod: CPTII,S$GLB,, | Performed by: FAMILY MEDICINE

## 2022-05-10 RX ORDER — METFORMIN HYDROCHLORIDE 500 MG/1
250 TABLET ORAL 2 TIMES DAILY WITH MEALS
Qty: 30 TABLET | Refills: 11 | Status: SHIPPED | OUTPATIENT
Start: 2022-05-10 | End: 2023-06-22 | Stop reason: SDUPTHER

## 2022-05-10 NOTE — PROGRESS NOTES
Subjective:       Patient ID: Mumtaz Levy is a 62 y.o. male.    Chief Complaint: Follow-up    Patient presents for follow-up on his chronic conditions.  He has history of diabetes which was not well controlled on medication at last check, but he did not start the metformin.  He is here for recheck. He has not changed his diet.  Also with history of hypertension.  His blood pressure is high today.  He denies any side effects from the medication.  Also with history of mixed hyperlipidemia.  He restarted his atorvastatin 4 months ago.  Also with history of peripheral vascular disease.  He has no symptoms from this.        Follow-up  Pertinent negatives include no abdominal pain, arthralgias, chest pain, chills, fatigue, fever, headaches, nausea, numbness, rash, vomiting or weakness.     Review of Systems   Constitutional: Negative for chills, fatigue, fever and unexpected weight change.   Eyes: Negative for visual disturbance.   Respiratory: Negative for shortness of breath.    Cardiovascular: Negative for chest pain, palpitations and leg swelling.   Gastrointestinal: Negative for abdominal pain, blood in stool, constipation, diarrhea, nausea and vomiting.   Genitourinary: Negative for dysuria and hematuria.   Musculoskeletal: Negative for arthralgias and back pain.   Skin: Negative for rash and wound.   Neurological: Negative for dizziness, tremors, syncope, weakness, light-headedness, numbness and headaches.   Hematological: Negative for adenopathy. Does not bruise/bleed easily.   Psychiatric/Behavioral: Negative for dysphoric mood. The patient is not nervous/anxious.      Medication List with Changes/Refills   New Medications    METFORMIN (GLUCOPHAGE) 500 MG TABLET    Take 0.5 tablets (250 mg total) by mouth 2 (two) times daily with meals.   Current Medications    ASPIRIN 325 MG TABLET    Take 81 mg by mouth once daily.     ATORVASTATIN (LIPITOR) 40 MG TABLET    Take 1 tablet (40 mg total) by mouth once daily.     "BLOOD SUGAR DIAGNOSTIC (TRUE METRIX GLUCOSE TEST STRIP) STRP    TEST BLOOD GLUCOSE ONCE DAILY AS DIRECTED** TESTING TWICE DAILY PER PT    LANCETS (MICRO THIN LANCETS) 33 GAUGE MISC    1 lancet by Misc.(Non-Drug; Combo Route) route once daily.    LINAGLIPTIN (TRADJENTA) 5 MG TAB TABLET    Take 1 tablet (5 mg total) by mouth once daily.    METFORMIN (GLUCOPHAGE) 500 MG TABLET    Take 0.5 tablets (250 mg total) by mouth 2 (two) times daily with meals.    TRUE METRIX GLUCOSE METER MISC    USE TO TEST BLOOD SUGAR DAILY AS DIRECTED         Objective:      BP (!) 145/79 (BP Location: Left arm, Patient Position: Sitting, BP Method: Medium (Automatic))   Pulse 60   Ht 5' 7" (1.702 m)   Wt 78.5 kg (173 lb)   SpO2 100%   BMI 27.10 kg/m²   Estimated body mass index is 27.1 kg/m² as calculated from the following:    Height as of this encounter: 5' 7" (1.702 m).    Weight as of this encounter: 78.5 kg (173 lb).  Physical Exam  Vitals reviewed.   Constitutional:       General: He is not in acute distress.     Appearance: Normal appearance. He is well-developed and normal weight. He is not ill-appearing.   HENT:      Head: Normocephalic and atraumatic.   Eyes:      Conjunctiva/sclera: Conjunctivae normal.   Neck:      Thyroid: No thyromegaly.   Cardiovascular:      Rate and Rhythm: Normal rate and regular rhythm.      Heart sounds: Normal heart sounds. No murmur heard.  Pulmonary:      Effort: Pulmonary effort is normal.      Breath sounds: Normal breath sounds.   Abdominal:      General: Bowel sounds are normal.      Palpations: Abdomen is soft. There is no mass.      Tenderness: There is no abdominal tenderness. There is no guarding or rebound.   Musculoskeletal:         General: Normal range of motion.      Cervical back: Neck supple.   Skin:     General: Skin is warm and dry.   Neurological:      General: No focal deficit present.      Mental Status: He is alert and oriented to person, place, and time.   Psychiatric:       "   Mood and Affect: Mood normal.         Behavior: Behavior normal.         Thought Content: Thought content normal.         Judgment: Judgment normal.         Assessment:       Problem List Items Addressed This Visit        Cardiac/Vascular    Mixed hyperlipidemia    Essential hypertension       Endocrine    Type 2 diabetes mellitus without complication, without long-term current use of insulin - Primary    Relevant Medications    metFORMIN (GLUCOPHAGE) 500 MG tablet            Plan:       Type 2 diabetes mellitus without complication, without long-term current use of insulin  -     metFORMIN (GLUCOPHAGE) 500 MG tablet; Take 0.5 tablets (250 mg total) by mouth 2 (two) times daily with meals.  Dispense: 30 tablet; Refill: 11    Essential hypertension    Mixed hyperlipidemia              DISCUSSION:  Get labs.  Cont meds.  Healthy diet and exercise. Another long discussion on dm and its complications. Start the metformin.  Make apt with nephcristin medellin.  Cont to f/u with dr mcclain for cardio.

## 2022-05-11 ENCOUNTER — PATIENT MESSAGE (OUTPATIENT)
Dept: PRIMARY CARE CLINIC | Facility: CLINIC | Age: 62
End: 2022-05-11
Payer: COMMERCIAL

## 2022-05-11 LAB
ALBUMIN SERPL-MCNC: 4.2 G/DL (ref 3.6–5.1)
ALBUMIN/GLOB SERPL: 1.6 (CALC) (ref 1–2.5)
ALP SERPL-CCNC: 92 U/L (ref 35–144)
ALT SERPL-CCNC: 39 U/L (ref 9–46)
AST SERPL-CCNC: 20 U/L (ref 10–35)
BILIRUB SERPL-MCNC: 0.6 MG/DL (ref 0.2–1.2)
BUN SERPL-MCNC: 26 MG/DL (ref 7–25)
BUN/CREAT SERPL: 19 (CALC) (ref 6–22)
CALCIUM SERPL-MCNC: 9.6 MG/DL (ref 8.6–10.3)
CHLORIDE SERPL-SCNC: 106 MMOL/L (ref 98–110)
CHOLEST SERPL-MCNC: 131 MG/DL
CHOLEST/HDLC SERPL: 4 (CALC)
CO2 SERPL-SCNC: 23 MMOL/L (ref 20–32)
CREAT SERPL-MCNC: 1.36 MG/DL (ref 0.7–1.25)
GLOBULIN SER CALC-MCNC: 2.7 G/DL (CALC) (ref 1.9–3.7)
GLUCOSE SERPL-MCNC: 145 MG/DL (ref 65–99)
HBA1C MFR BLD: 7.8 % OF TOTAL HGB
HDLC SERPL-MCNC: 33 MG/DL
LDLC SERPL CALC-MCNC: 74 MG/DL (CALC)
NONHDLC SERPL-MCNC: 98 MG/DL (CALC)
POTASSIUM SERPL-SCNC: 4.5 MMOL/L (ref 3.5–5.3)
PROT SERPL-MCNC: 6.9 G/DL (ref 6.1–8.1)
SODIUM SERPL-SCNC: 140 MMOL/L (ref 135–146)
TRIGL SERPL-MCNC: 153 MG/DL

## 2022-06-09 LAB — HEMOCCULT STL QL IA: NEGATIVE

## 2022-06-14 ENCOUNTER — TELEPHONE (OUTPATIENT)
Dept: PRIMARY CARE CLINIC | Facility: CLINIC | Age: 62
End: 2022-06-14
Payer: COMMERCIAL

## 2022-06-14 NOTE — TELEPHONE ENCOUNTER
----- Message from Navarro Thompson MD sent at 6/12/2022  2:12 PM CDT -----    Please let him know that his stool test was normal.

## 2022-07-28 ENCOUNTER — OFFICE VISIT (OUTPATIENT)
Dept: FAMILY MEDICINE | Facility: CLINIC | Age: 62
End: 2022-07-28
Payer: COMMERCIAL

## 2022-07-28 VITALS
HEIGHT: 67 IN | TEMPERATURE: 97 F | BODY MASS INDEX: 27 KG/M2 | HEART RATE: 68 BPM | DIASTOLIC BLOOD PRESSURE: 82 MMHG | WEIGHT: 172 LBS | OXYGEN SATURATION: 96 % | SYSTOLIC BLOOD PRESSURE: 136 MMHG

## 2022-07-28 DIAGNOSIS — Z12.5 PROSTATE CANCER SCREENING: ICD-10-CM

## 2022-07-28 DIAGNOSIS — E78.2 MIXED HYPERLIPIDEMIA: ICD-10-CM

## 2022-07-28 DIAGNOSIS — Z87.442 HISTORY OF RENAL CALCULI: ICD-10-CM

## 2022-07-28 DIAGNOSIS — E11.9 TYPE 2 DIABETES MELLITUS WITHOUT COMPLICATION, WITHOUT LONG-TERM CURRENT USE OF INSULIN: ICD-10-CM

## 2022-07-28 DIAGNOSIS — Z00.00 ROUTINE GENERAL MEDICAL EXAMINATION AT A HEALTH CARE FACILITY: ICD-10-CM

## 2022-07-28 DIAGNOSIS — I73.9 PERIPHERAL VASCULAR DISEASE: Primary | ICD-10-CM

## 2022-07-28 DIAGNOSIS — I10 ESSENTIAL HYPERTENSION: ICD-10-CM

## 2022-07-28 DIAGNOSIS — N28.9 RENAL INSUFFICIENCY: ICD-10-CM

## 2022-07-28 PROCEDURE — 3075F PR MOST RECENT SYSTOLIC BLOOD PRESS GE 130-139MM HG: ICD-10-PCS | Mod: CPTII,S$GLB,, | Performed by: INTERNAL MEDICINE

## 2022-07-28 PROCEDURE — 1160F RVW MEDS BY RX/DR IN RCRD: CPT | Mod: CPTII,S$GLB,, | Performed by: INTERNAL MEDICINE

## 2022-07-28 PROCEDURE — 99204 PR OFFICE/OUTPT VISIT, NEW, LEVL IV, 45-59 MIN: ICD-10-PCS | Mod: S$GLB,,, | Performed by: INTERNAL MEDICINE

## 2022-07-28 PROCEDURE — 3008F PR BODY MASS INDEX (BMI) DOCUMENTED: ICD-10-PCS | Mod: CPTII,S$GLB,, | Performed by: INTERNAL MEDICINE

## 2022-07-28 PROCEDURE — 1159F MED LIST DOCD IN RCRD: CPT | Mod: CPTII,S$GLB,, | Performed by: INTERNAL MEDICINE

## 2022-07-28 PROCEDURE — 3051F HG A1C>EQUAL 7.0%<8.0%: CPT | Mod: CPTII,S$GLB,, | Performed by: INTERNAL MEDICINE

## 2022-07-28 PROCEDURE — 3075F SYST BP GE 130 - 139MM HG: CPT | Mod: CPTII,S$GLB,, | Performed by: INTERNAL MEDICINE

## 2022-07-28 PROCEDURE — 1159F PR MEDICATION LIST DOCUMENTED IN MEDICAL RECORD: ICD-10-PCS | Mod: CPTII,S$GLB,, | Performed by: INTERNAL MEDICINE

## 2022-07-28 PROCEDURE — 3008F BODY MASS INDEX DOCD: CPT | Mod: CPTII,S$GLB,, | Performed by: INTERNAL MEDICINE

## 2022-07-28 PROCEDURE — 1160F PR REVIEW ALL MEDS BY PRESCRIBER/CLIN PHARMACIST DOCUMENTED: ICD-10-PCS | Mod: CPTII,S$GLB,, | Performed by: INTERNAL MEDICINE

## 2022-07-28 PROCEDURE — 3079F DIAST BP 80-89 MM HG: CPT | Mod: CPTII,S$GLB,, | Performed by: INTERNAL MEDICINE

## 2022-07-28 PROCEDURE — 3079F PR MOST RECENT DIASTOLIC BLOOD PRESSURE 80-89 MM HG: ICD-10-PCS | Mod: CPTII,S$GLB,, | Performed by: INTERNAL MEDICINE

## 2022-07-28 PROCEDURE — 3051F PR MOST RECENT HEMOGLOBIN A1C LEVEL 7.0 - < 8.0%: ICD-10-PCS | Mod: CPTII,S$GLB,, | Performed by: INTERNAL MEDICINE

## 2022-07-28 PROCEDURE — 99204 OFFICE O/P NEW MOD 45 MIN: CPT | Mod: S$GLB,,, | Performed by: INTERNAL MEDICINE

## 2022-07-28 RX ORDER — CICLOPIROX 80 MG/ML
SOLUTION TOPICAL NIGHTLY
Qty: 6.6 ML | Refills: 3 | Status: SHIPPED | OUTPATIENT
Start: 2022-07-28 | End: 2023-07-18

## 2022-07-28 RX ORDER — AMLODIPINE BESYLATE 5 MG/1
5 TABLET ORAL DAILY
COMMUNITY
End: 2022-07-28

## 2022-07-28 NOTE — PROGRESS NOTES
"Subjective:       Patient ID: Mumtaz Levy is a 62 y.o. male.    Chief Complaint: Establish Care      HPI: Mumtaz comes in today to establish.  He has been followed by Dr. Thompson.  He has a history of hypertension and diabetes mellitus which has been controlled more recently.  He has no history of heart disease.  He denies chest pains or shortness of breath.  He does have what appears to be in a mild onychomycosis of 1 of his smaller toenails.  Were going to let him try Penlac.  I have reviewed some blood work done more recent.  His last A1c was from about 6 months ago.  I think we can postpone repeat blood work today comes back.  He did do a Cologuard recently and that was negative.  He does have a history kidney stones and said he has been checked by Urology.       Past Medical History:   Past Medical History:   Diagnosis Date    Diabetes mellitus, type 2     Encounter for blood transfusion     Hyperlipidemia     Hypertension     Peripheral artery disease        Past Surgical Historical:   Past Surgical History:   Procedure Laterality Date    bypass lower extrem Bilateral         Vitals: /82   Pulse 68   Temp 96.6 °F (35.9 °C)   Ht 5' 7" (1.702 m)   Wt 78 kg (172 lb)   SpO2 96%   BMI 26.94 kg/m²      Medications:   Medication List with Changes/Refills   New Medications    CICLOPIROX (PENLAC) 8 % SOLN    Apply topically nightly.   Current Medications    ASPIRIN (ECOTRIN) 81 MG EC TABLET    Take 81 mg by mouth once daily.     ATORVASTATIN (LIPITOR) 40 MG TABLET    Take 1 tablet (40 mg total) by mouth once daily.    BLOOD SUGAR DIAGNOSTIC (TRUE METRIX GLUCOSE TEST STRIP) STRP    TEST BLOOD GLUCOSE ONCE DAILY AS DIRECTED** TESTING TWICE DAILY PER PT    LANCETS (MICRO THIN LANCETS) 33 GAUGE MISC    1 lancet by Misc.(Non-Drug; Combo Route) route once daily.    LINAGLIPTIN (TRADJENTA) 5 MG TAB TABLET    Take 1 tablet (5 mg total) by mouth once daily.    METFORMIN (GLUCOPHAGE) 500 MG TABLET    Take 0.5 " Spoke with pt about the following per Dr. Montez. [facility]    -- Please let patient know that leonidas emailed a script for bactrim to her pharm tablets (250 mg total) by mouth 2 (two) times daily with meals.    TRUE METRIX GLUCOSE METER MISC    USE TO TEST BLOOD SUGAR DAILY AS DIRECTED   Discontinued Medications    AMLODIPINE (NORVASC) 5 MG TABLET    Take 5 mg by mouth once daily.        Past Social History:   Social History     Socioeconomic History    Marital status:    Tobacco Use    Smoking status: Former Smoker     Types: Cigarettes     Quit date: 10/24/2018     Years since quitting: 3.7    Smokeless tobacco: Former User       Allergies:   Review of patient's allergies indicates:   Allergen Reactions    Grass pollen-june grass standard Other (See Comments)     Watery eyes and sneezing    Penicillins         Family History:   Family History   Problem Relation Age of Onset    Heart disease Mother     Diabetes Father         Review of Systems:  Review of Systems   Constitutional: Negative for appetite change and fever.   HENT: Negative for sneezing, sore throat and trouble swallowing.    Respiratory: Negative for chest tightness and shortness of breath.    Cardiovascular: Negative for chest pain and palpitations.   Gastrointestinal: Negative for abdominal pain, change in bowel habit and change in bowel habit.   Genitourinary: Negative for dysuria and hematuria.   Musculoskeletal: Negative for gait problem and neck stiffness.   Integumentary:  Negative for rash and wound.   Neurological: Negative for seizures and syncope.   Psychiatric/Behavioral: Negative for confusion and suicidal ideas.        Physical Exam:  Physical Exam  Vitals reviewed.   Constitutional:       Appearance: Normal appearance.   HENT:      Head: Normocephalic and atraumatic.   Eyes:      Conjunctiva/sclera: Conjunctivae normal.      Pupils: Pupils are equal, round, and reactive to light.   Neck:      Thyroid: No thyroid mass.   Cardiovascular:      Rate and Rhythm: Normal rate and regular rhythm.   Pulmonary:      Effort: Pulmonary effort is normal.      Breath sounds:  Normal breath sounds.   Abdominal:      General: Abdomen is flat.      Palpations: Abdomen is soft.   Musculoskeletal:         General: No swelling or deformity. Normal range of motion.      Cervical back: Normal range of motion and neck supple.      Comments: No Cyanosis.    Skin:     General: Skin is warm and dry.      Findings: No rash.      Comments: No induration.    Neurological:      General: No focal deficit present.      Mental Status: He is alert and oriented to person, place, and time.      Sensory: Sensation is intact.      Gait: Gait normal.   Psychiatric:         Mood and Affect: Mood normal.         Judgment: Judgment normal.          Labs:  Orders Only on 05/10/2022   Component Date Value Ref Range Status    Fecal Globin, Insure 06/04/2022 Negative  Negative Final   Office Visit on 05/10/2022   Component Date Value Ref Range Status    Glucose 05/10/2022 145 (A) 65 - 99 mg/dL Final    BUN 05/10/2022 26 (A) 7 - 25 mg/dL Final    Creatinine 05/10/2022 1.36 (A) 0.70 - 1.25 mg/dL Final    eGFR if non African American 05/10/2022 55 (A) > OR = 60 mL/min/1.73m2 Final    eGFR if  05/10/2022 64  > OR = 60 mL/min/1.73m2 Final    BUN/Creatinine Ratio 05/10/2022 19  6 - 22 (calc) Final    Sodium 05/10/2022 140  135 - 146 mmol/L Final    Potassium 05/10/2022 4.5  3.5 - 5.3 mmol/L Final    Chloride 05/10/2022 106  98 - 110 mmol/L Final    CO2 05/10/2022 23  20 - 32 mmol/L Final    Calcium 05/10/2022 9.6  8.6 - 10.3 mg/dL Final    Total Protein 05/10/2022 6.9  6.1 - 8.1 g/dL Final    Albumin 05/10/2022 4.2  3.6 - 5.1 g/dL Final    Globulin, Total 05/10/2022 2.7  1.9 - 3.7 g/dL (calc) Final    Albumin/Globulin Ratio 05/10/2022 1.6  1.0 - 2.5 (calc) Final    Total Bilirubin 05/10/2022 0.6  0.2 - 1.2 mg/dL Final    Alkaline Phosphatase 05/10/2022 92  35 - 144 U/L Final    AST 05/10/2022 20  10 - 35 U/L Final    ALT 05/10/2022 39  9 - 46 U/L Final    Hemoglobin A1C 05/10/2022 7.8 (A)  <5.7 % of total Hgb Final    Cholesterol 05/10/2022 131  <200 mg/dL Final    HDL 05/10/2022 33 (A) > OR = 40 mg/dL Final    Triglycerides 05/10/2022 153 (A) <150 mg/dL Final    LDL Cholesterol 05/10/2022 74  mg/dL (calc) Final    HDL/Cholesterol Ratio 05/10/2022 4.0  <5.0 (calc) Final    Non HDL Chol. (LDL+VLDL) 05/10/2022 98  <130 mg/dL (calc) Final        Assessment/Plan:       Problem List Items Addressed This Visit        Cardiac/Vascular    Peripheral vascular disease - Primary    Mixed hyperlipidemia    Relevant Orders    Lipid Panel    Essential hypertension    Relevant Orders    Basic Metabolic Panel       Renal/    History of renal calculi    Renal insufficiency       Endocrine    Type 2 diabetes mellitus without complication, without long-term current use of insulin    Relevant Orders    Hemoglobin A1C      Other Visit Diagnoses     Prostate cancer screening        Relevant Orders    Prostate Specific Antigen, Diagnostic    Routine general medical examination at a health care facility        Relevant Medications    ciclopirox (PENLAC) 8 % Soln    Other Relevant Orders    CBC Auto Differential    Hepatic Function Panel    TSH    Vitamin D               Follow up in about 6 months (around 1/28/2023).     Durga Mai

## 2023-01-04 ENCOUNTER — TELEPHONE (OUTPATIENT)
Dept: FAMILY MEDICINE | Facility: CLINIC | Age: 63
End: 2023-01-04
Payer: COMMERCIAL

## 2023-01-05 NOTE — TELEPHONE ENCOUNTER
Is going to try otc Mucinex and decongestant.  He is having sinus problems and breathing thru his nose.  If not better will call us back.

## 2023-01-05 NOTE — TELEPHONE ENCOUNTER
----- Message from Grace Pablo sent at 1/4/2023  3:56 PM CST -----  Regarding: Same day appointment  Contact: self  .Type:  Same Day Appointment Request    Caller is requesting a same day appointment.  Caller declined first available appointment listed below.    Name of Caller:Mary  When is the first available appointment?n/a  Symptoms:upper respiratory issues  Best Call Back Number:[phone]    Additional Information: The patient wants to be seen today or have medication called out for the problem.    [facility] #[id number] [address] & [address] [address]  Phone: [phone] Fax: [phone]        MARY RENAE [[id number]]  Durga Mai

## 2023-02-02 ENCOUNTER — OFFICE VISIT (OUTPATIENT)
Dept: FAMILY MEDICINE | Facility: CLINIC | Age: 63
End: 2023-02-02
Payer: COMMERCIAL

## 2023-02-02 VITALS
DIASTOLIC BLOOD PRESSURE: 80 MMHG | SYSTOLIC BLOOD PRESSURE: 130 MMHG | OXYGEN SATURATION: 96 % | TEMPERATURE: 97 F | BODY MASS INDEX: 27.07 KG/M2 | HEART RATE: 66 BPM | HEIGHT: 67 IN | WEIGHT: 172.5 LBS

## 2023-02-02 DIAGNOSIS — I73.9 PERIPHERAL VASCULAR DISEASE: Primary | ICD-10-CM

## 2023-02-02 DIAGNOSIS — I10 ESSENTIAL HYPERTENSION: ICD-10-CM

## 2023-02-02 DIAGNOSIS — E11.9 TYPE 2 DIABETES MELLITUS WITHOUT COMPLICATION, WITHOUT LONG-TERM CURRENT USE OF INSULIN: ICD-10-CM

## 2023-02-02 DIAGNOSIS — N28.9 RENAL INSUFFICIENCY: ICD-10-CM

## 2023-02-02 DIAGNOSIS — E78.2 MIXED HYPERLIPIDEMIA: ICD-10-CM

## 2023-02-02 DIAGNOSIS — R10.9 FLANK PAIN: ICD-10-CM

## 2023-02-02 LAB
AMORPH URATE CRY URNS QL MICRO: ABNORMAL
BACTERIA #/AREA URNS HPF: ABNORMAL /[HPF]
BILIRUB UR QL STRIP: NEGATIVE
CLARITY UR: CLEAR
COLOR UR: YELLOW
EPITHELIAL CELLS: ABNORMAL
GLUCOSE (UA): NEGATIVE MG/DL
KETONES UR QL STRIP: NEGATIVE MG/DL
LEUKOCYTE ESTERASE UR QL STRIP: NEGATIVE
MUCOUS THREADS URNS QL MICRO: NEGATIVE
NITRITE UR QL STRIP: NEGATIVE
OCCULT BLOOD: NEGATIVE
PH, URINE: 5 (ref 5–7.5)
PROT UR QL STRIP: 25 MG/DL
RBC/HPF: NEGATIVE
SP GR UR STRIP: 1.02 (ref 1–1.03)
UROBILINOGEN, URINE: NORMAL E.U./DL (ref 0–1)
WBC/HPF: ABNORMAL

## 2023-02-02 PROCEDURE — 1160F RVW MEDS BY RX/DR IN RCRD: CPT | Mod: CPTII,S$GLB,, | Performed by: INTERNAL MEDICINE

## 2023-02-02 PROCEDURE — 99214 PR OFFICE/OUTPT VISIT, EST, LEVL IV, 30-39 MIN: ICD-10-PCS | Mod: S$GLB,,, | Performed by: INTERNAL MEDICINE

## 2023-02-02 PROCEDURE — 3075F PR MOST RECENT SYSTOLIC BLOOD PRESS GE 130-139MM HG: ICD-10-PCS | Mod: CPTII,S$GLB,, | Performed by: INTERNAL MEDICINE

## 2023-02-02 PROCEDURE — 3075F SYST BP GE 130 - 139MM HG: CPT | Mod: CPTII,S$GLB,, | Performed by: INTERNAL MEDICINE

## 2023-02-02 PROCEDURE — 99214 OFFICE O/P EST MOD 30 MIN: CPT | Mod: S$GLB,,, | Performed by: INTERNAL MEDICINE

## 2023-02-02 PROCEDURE — 1159F PR MEDICATION LIST DOCUMENTED IN MEDICAL RECORD: ICD-10-PCS | Mod: CPTII,S$GLB,, | Performed by: INTERNAL MEDICINE

## 2023-02-02 PROCEDURE — 3079F DIAST BP 80-89 MM HG: CPT | Mod: CPTII,S$GLB,, | Performed by: INTERNAL MEDICINE

## 2023-02-02 PROCEDURE — 3008F PR BODY MASS INDEX (BMI) DOCUMENTED: ICD-10-PCS | Mod: CPTII,S$GLB,, | Performed by: INTERNAL MEDICINE

## 2023-02-02 PROCEDURE — 1159F MED LIST DOCD IN RCRD: CPT | Mod: CPTII,S$GLB,, | Performed by: INTERNAL MEDICINE

## 2023-02-02 PROCEDURE — 3079F PR MOST RECENT DIASTOLIC BLOOD PRESSURE 80-89 MM HG: ICD-10-PCS | Mod: CPTII,S$GLB,, | Performed by: INTERNAL MEDICINE

## 2023-02-02 PROCEDURE — 1160F PR REVIEW ALL MEDS BY PRESCRIBER/CLIN PHARMACIST DOCUMENTED: ICD-10-PCS | Mod: CPTII,S$GLB,, | Performed by: INTERNAL MEDICINE

## 2023-02-02 PROCEDURE — 3008F BODY MASS INDEX DOCD: CPT | Mod: CPTII,S$GLB,, | Performed by: INTERNAL MEDICINE

## 2023-02-02 RX ORDER — AMLODIPINE BESYLATE 5 MG/1
5 TABLET ORAL
COMMUNITY
Start: 2022-12-16 | End: 2023-07-18 | Stop reason: SDUPTHER

## 2023-02-02 RX ORDER — KETOROLAC TROMETHAMINE 10 MG/1
10 TABLET, FILM COATED ORAL EVERY 6 HOURS
Qty: 20 TABLET | Refills: 0 | Status: SHIPPED | OUTPATIENT
Start: 2023-02-02 | End: 2023-02-07

## 2023-02-02 NOTE — PROGRESS NOTES
"Subjective:       Patient ID: Mumtaz Levy is a 62 y.o. male.    Chief Complaint: No chief complaint on file.      HPI: Mumtaz comes in today to establish.  He has been followed by Donna.  He has a history of hypertension and kidney stones.  He is also diabetic.  He says his fasting sugars are running sometimes in the 130s.  I would like to see what his A1c shows before we consider any changes.  He did do a fecal occult blood card last year which was negative but I told him I would like for him to consider a colonoscopy or at least the Cologuard.  He has no first-degree relatives with colon cancer and he has no GI symptoms.  He is having some discomfort that makes him think he is trying to pass a stone.  We are going to check a urinalysis and do an ultrasound to see if we can see anything.  I will give him some oral tramadol to have on hand at home in case he begins to pass 1 of these.  I have recommended the Shingrix and the Pneumovax.  I have reviewed blood work that he had done last year       Past Medical History:   Past Medical History:   Diagnosis Date    Diabetes mellitus, type 2     Encounter for blood transfusion     Hyperlipidemia     Hypertension     Peripheral artery disease        Past Surgical Historical:   Past Surgical History:   Procedure Laterality Date    bypass lower extrem Bilateral         Vitals: /80 (BP Location: Left arm, Patient Position: Sitting, BP Method: Medium (Manual))   Pulse 66   Temp 96.5 °F (35.8 °C)   Ht 5' 7" (1.702 m)   Wt 78.2 kg (172 lb 8 oz)   SpO2 96%   BMI 27.02 kg/m²      Medications:   Medication List with Changes/Refills   Current Medications    AMLODIPINE (NORVASC) 5 MG TABLET    Take 5 mg by mouth.    ASPIRIN (ECOTRIN) 81 MG EC TABLET    Take 81 mg by mouth once daily.     ATORVASTATIN (LIPITOR) 40 MG TABLET    Take 1 tablet (40 mg total) by mouth once daily.    BLOOD SUGAR DIAGNOSTIC (TRUE METRIX GLUCOSE TEST STRIP) STRP    TEST BLOOD GLUCOSE ONCE " DAILY AS DIRECTED** TESTING TWICE DAILY PER PT    CICLOPIROX (PENLAC) 8 % SOLN    Apply topically nightly.    LANCETS (MICRO THIN LANCETS) 33 GAUGE MISC    1 lancet by Misc.(Non-Drug; Combo Route) route once daily.    LINAGLIPTIN (TRADJENTA) 5 MG TAB TABLET    Take 1 tablet (5 mg total) by mouth once daily.    METFORMIN (GLUCOPHAGE) 500 MG TABLET    Take 0.5 tablets (250 mg total) by mouth 2 (two) times daily with meals.    TRUE METRIX GLUCOSE METER MISC    USE TO TEST BLOOD SUGAR DAILY AS DIRECTED        Past Social History:   Social History     Socioeconomic History    Marital status:    Tobacco Use    Smoking status: Former     Types: Cigarettes     Quit date: 10/24/2018     Years since quittin.2    Smokeless tobacco: Former       Allergies:   Review of patient's allergies indicates:   Allergen Reactions    Grass pollen- grass standard Other (See Comments)     Watery eyes and sneezing    Penicillins         Family History:   Family History   Problem Relation Age of Onset    Heart disease Mother     Diabetes Father         Review of Systems:  Review of Systems   Respiratory:  Negative for shortness of breath and wheezing.    Cardiovascular:  Negative for chest pain and palpitations.   Gastrointestinal:  Negative for abdominal pain, change in bowel habit and change in bowel habit.   Neurological:  Negative for dizziness and syncope.      Physical Exam:  Physical Exam  Vitals reviewed.   Constitutional:       Appearance: Normal appearance.   Cardiovascular:      Rate and Rhythm: Normal rate and regular rhythm.   Pulmonary:      Effort: Pulmonary effort is normal.      Breath sounds: Normal breath sounds.   Abdominal:      General: Abdomen is flat.      Palpations: Abdomen is soft.   Skin:     General: Skin is warm and dry.      Comments: No Induration.    Neurological:      General: No focal deficit present.      Mental Status: He is alert and oriented to person, place, and time.   Psychiatric:          Mood and Affect: Mood normal.      Comments: Oriented.         Labs:  No visits with results within 6 Month(s) from this visit.   Latest known visit with results is:   Orders Only on 05/10/2022   Component Date Value Ref Range Status    Fecal Globin, Insure 06/04/2022 Negative  Negative Final        Assessment/Plan:       Problem List Items Addressed This Visit          Cardiac/Vascular    Peripheral vascular disease - Primary    Mixed hyperlipidemia    Essential hypertension       Renal/    Renal insufficiency       Endocrine    Type 2 diabetes mellitus without complication, without long-term current use of insulin     Other Visit Diagnoses       Flank pain        Relevant Orders    Urinalysis, Reflex to Urine Culture Urine, Clean Catch    US Retroperitoneal Complete                 Follow up in about 6 months (around 8/2/2023).     Durga Mai

## 2023-02-03 ENCOUNTER — PATIENT MESSAGE (OUTPATIENT)
Dept: FAMILY MEDICINE | Facility: CLINIC | Age: 63
End: 2023-02-03
Payer: COMMERCIAL

## 2023-02-08 ENCOUNTER — PATIENT MESSAGE (OUTPATIENT)
Dept: ADMINISTRATIVE | Facility: HOSPITAL | Age: 63
End: 2023-02-08
Payer: COMMERCIAL

## 2023-02-13 DIAGNOSIS — N20.0 KIDNEY STONE: ICD-10-CM

## 2023-02-13 DIAGNOSIS — R97.20 PSA ELEVATION: Primary | ICD-10-CM

## 2023-02-28 DIAGNOSIS — E11.9 TYPE 2 DIABETES MELLITUS WITHOUT COMPLICATION, WITHOUT LONG-TERM CURRENT USE OF INSULIN: ICD-10-CM

## 2023-03-21 ENCOUNTER — OFFICE VISIT (OUTPATIENT)
Dept: UROLOGY | Facility: CLINIC | Age: 63
End: 2023-03-21
Payer: COMMERCIAL

## 2023-03-21 ENCOUNTER — HOSPITAL ENCOUNTER (OUTPATIENT)
Dept: RADIOLOGY | Facility: CLINIC | Age: 63
Discharge: HOME | End: 2023-03-21
Attending: UROLOGY
Payer: COMMERCIAL

## 2023-03-21 VITALS
RESPIRATION RATE: 18 BRPM | TEMPERATURE: 98 F | WEIGHT: 168 LBS | HEIGHT: 67 IN | SYSTOLIC BLOOD PRESSURE: 145 MMHG | HEART RATE: 73 BPM | DIASTOLIC BLOOD PRESSURE: 71 MMHG | BODY MASS INDEX: 26.37 KG/M2

## 2023-03-21 DIAGNOSIS — N20.0 KIDNEY STONE: ICD-10-CM

## 2023-03-21 DIAGNOSIS — R97.20 PSA ELEVATION: ICD-10-CM

## 2023-03-21 PROCEDURE — 1159F MED LIST DOCD IN RCRD: CPT | Mod: CPTII,S$GLB,, | Performed by: UROLOGY

## 2023-03-21 PROCEDURE — 1159F PR MEDICATION LIST DOCUMENTED IN MEDICAL RECORD: ICD-10-PCS | Mod: CPTII,S$GLB,, | Performed by: UROLOGY

## 2023-03-21 PROCEDURE — 3008F PR BODY MASS INDEX (BMI) DOCUMENTED: ICD-10-PCS | Mod: CPTII,S$GLB,, | Performed by: UROLOGY

## 2023-03-21 PROCEDURE — 3078F DIAST BP <80 MM HG: CPT | Mod: CPTII,S$GLB,, | Performed by: UROLOGY

## 2023-03-21 PROCEDURE — 3078F PR MOST RECENT DIASTOLIC BLOOD PRESSURE < 80 MM HG: ICD-10-PCS | Mod: CPTII,S$GLB,, | Performed by: UROLOGY

## 2023-03-21 PROCEDURE — 74018 RADEX ABDOMEN 1 VIEW: CPT | Mod: TC,,, | Performed by: UROLOGY

## 2023-03-21 PROCEDURE — 74018 XR ABDOMEN AP 1 VIEW: ICD-10-PCS | Mod: TC,,, | Performed by: UROLOGY

## 2023-03-21 PROCEDURE — 74018 XR ABDOMEN AP 1 VIEW: ICD-10-PCS | Mod: 26,,, | Performed by: RADIOLOGY

## 2023-03-21 PROCEDURE — 3077F SYST BP >= 140 MM HG: CPT | Mod: CPTII,S$GLB,, | Performed by: UROLOGY

## 2023-03-21 PROCEDURE — 1160F RVW MEDS BY RX/DR IN RCRD: CPT | Mod: CPTII,S$GLB,, | Performed by: UROLOGY

## 2023-03-21 PROCEDURE — 3008F BODY MASS INDEX DOCD: CPT | Mod: CPTII,S$GLB,, | Performed by: UROLOGY

## 2023-03-21 PROCEDURE — 99204 PR OFFICE/OUTPT VISIT, NEW, LEVL IV, 45-59 MIN: ICD-10-PCS | Mod: S$GLB,,, | Performed by: UROLOGY

## 2023-03-21 PROCEDURE — 1160F PR REVIEW ALL MEDS BY PRESCRIBER/CLIN PHARMACIST DOCUMENTED: ICD-10-PCS | Mod: CPTII,S$GLB,, | Performed by: UROLOGY

## 2023-03-21 PROCEDURE — 3077F PR MOST RECENT SYSTOLIC BLOOD PRESSURE >= 140 MM HG: ICD-10-PCS | Mod: CPTII,S$GLB,, | Performed by: UROLOGY

## 2023-03-21 PROCEDURE — 99204 OFFICE O/P NEW MOD 45 MIN: CPT | Mod: S$GLB,,, | Performed by: UROLOGY

## 2023-03-21 PROCEDURE — 74018 RADEX ABDOMEN 1 VIEW: CPT | Mod: 26,,, | Performed by: RADIOLOGY

## 2023-03-21 NOTE — PROGRESS NOTES
Subjective:       Patient ID: Mumtaz Levy is a 63 y.o. male.    Chief Complaint: Elevated PSA (Recent PSA 2023 5.79) and Nephrolithiasis (Imaging done at [facility] )      HPI:  63-year-old male had a recent renal ultrasound appears to have a 5 mm renal stone is asymptomatic he was referred here for further management    He is also have found to have an elevated PSA of 5.7  Past Medical History:   Past Medical History:   Diagnosis Date    Diabetes mellitus, type 2     Encounter for blood transfusion     Hyperlipidemia     Hypertension     Peripheral artery disease        Past Surgical Historical:   Past Surgical History:   Procedure Laterality Date    bypass lower extrem Bilateral         Medications:   Medication List with Changes/Refills   Current Medications    AMLODIPINE (NORVASC) 5 MG TABLET    Take 5 mg by mouth.    ASPIRIN (ECOTRIN) 81 MG EC TABLET    Take 81 mg by mouth once daily.     ATORVASTATIN (LIPITOR) 40 MG TABLET    Take 1 tablet (40 mg total) by mouth once daily.    BLOOD SUGAR DIAGNOSTIC (TRUE METRIX GLUCOSE TEST STRIP) STRP    TEST BLOOD GLUCOSE ONCE DAILY AS DIRECTED** TESTING TWICE DAILY PER PT    CICLOPIROX (PENLAC) 8 % SOLN    Apply topically nightly.    LANCETS (MICRO THIN LANCETS) 33 GAUGE MISC    1 lancet by Misc.(Non-Drug; Combo Route) route once daily.    LINAGLIPTIN (TRADJENTA) 5 MG TAB TABLET    Take 1 tablet (5 mg total) by mouth once daily.    METFORMIN (GLUCOPHAGE) 500 MG TABLET    Take 0.5 tablets (250 mg total) by mouth 2 (two) times daily with meals.    TRUE METRIX GLUCOSE METER MISC    USE TO TEST BLOOD SUGAR DAILY AS DIRECTED        Past Social History:   Social History     Socioeconomic History    Marital status:    Tobacco Use    Smoking status: Former     Packs/day: 1.00     Years: 25.00     Pack years: 25.00     Types: Cigarettes     Quit date: 10/24/2018     Years since quittin.4    Smokeless tobacco: Never   Substance and Sexual Activity    Alcohol use: Yes      Comment: rarely       Allergies:   Review of patient's allergies indicates:   Allergen Reactions    Grass pollen-june grass standard Other (See Comments)     Watery eyes and sneezing    Penicillins         Family History:   Family History   Problem Relation Age of Onset    Heart disease Mother     Diabetes Father         Review of Systems:  Review of Systems   Constitutional:  Negative for activity change and appetite change.   HENT:  Negative for congestion and dental problem.    Eyes:  Negative for visual disturbance.   Respiratory:  Negative for chest tightness and shortness of breath.    Cardiovascular:  Negative for chest pain.   Gastrointestinal:  Negative for abdominal distention and abdominal pain.   Endocrine: Negative for polyuria.   Genitourinary:  Negative for difficulty urinating, dysuria, flank pain, frequency, hematuria, penile discharge, penile pain, penile swelling, scrotal swelling, testicular pain and urgency.   Musculoskeletal:  Negative for back pain and neck pain.   Skin:  Negative for color change.   Allergic/Immunologic: Positive for immunocompromised state.   Neurological:  Negative for dizziness.   Hematological:  Negative for adenopathy.   Psychiatric/Behavioral:  Negative for agitation, behavioral problems and confusion.      Physical Exam:  Physical Exam  Constitutional:       General: He is not in acute distress.     Appearance: He is well-developed.   HENT:      Head: Normocephalic and atraumatic.      Nose: Nose normal.   Eyes:      General: No scleral icterus.     Conjunctiva/sclera: Conjunctivae normal.      Pupils: Pupils are equal, round, and reactive to light.   Neck:      Thyroid: No thyromegaly.      Trachea: No tracheal deviation.   Cardiovascular:      Rate and Rhythm: Normal rate and regular rhythm.      Heart sounds: Normal heart sounds.   Pulmonary:      Effort: Pulmonary effort is normal. No respiratory distress.      Breath sounds: Normal breath sounds. No wheezing or  rales.   Abdominal:      General: Bowel sounds are normal. There is no distension.      Palpations: Abdomen is soft.      Tenderness: There is no abdominal tenderness. There is no guarding or rebound.   Genitourinary:     Penis: Normal. No tenderness.       Prostate: Normal.   Musculoskeletal:         General: No deformity. Normal range of motion.      Cervical back: Neck supple.   Lymphadenopathy:      Cervical: No cervical adenopathy.   Skin:     General: Skin is warm and dry.      Findings: No erythema or rash.   Neurological:      Mental Status: He is alert and oriented to person, place, and time.      Cranial Nerves: No cranial nerve deficit.   Psychiatric:         Behavior: Behavior normal.       Assessment/Plan:       Problem List Items Addressed This Visit    None  Visit Diagnoses       PSA elevation        Kidney stone        Relevant Orders    X-Ray Abdomen AP 1 View               Possible 5 mm renal stone:  I discussed with the patient held a ultrasound can be very unreliable for identifying stones.  We will obtain a KUB now see if his stone is visible    Elevated PSA:  We will repeat PSA in 3 months

## 2023-06-21 ENCOUNTER — CLINICAL SUPPORT (OUTPATIENT)
Dept: UROLOGY | Facility: CLINIC | Age: 63
End: 2023-06-21
Payer: COMMERCIAL

## 2023-06-21 DIAGNOSIS — R97.20 PSA ELEVATION: Primary | ICD-10-CM

## 2023-06-21 LAB — PSA, DIAGNOSTIC: 6.11 NG/ML (ref 0.1–4)

## 2023-06-21 PROCEDURE — 36415 COLL VENOUS BLD VENIPUNCTURE: CPT | Mod: S$GLB,,, | Performed by: UROLOGY

## 2023-06-21 PROCEDURE — 36415 PR COLLECTION VENOUS BLOOD,VENIPUNCTURE: ICD-10-PCS | Mod: S$GLB,,, | Performed by: UROLOGY

## 2023-06-22 DIAGNOSIS — E11.9 TYPE 2 DIABETES MELLITUS WITHOUT COMPLICATION, WITHOUT LONG-TERM CURRENT USE OF INSULIN: ICD-10-CM

## 2023-06-22 DIAGNOSIS — E78.2 MIXED HYPERLIPIDEMIA: ICD-10-CM

## 2023-06-22 RX ORDER — ATORVASTATIN CALCIUM 40 MG/1
40 TABLET, FILM COATED ORAL DAILY
Qty: 90 TABLET | Refills: 0 | Status: SHIPPED | OUTPATIENT
Start: 2023-06-22 | End: 2023-07-18 | Stop reason: SDUPTHER

## 2023-06-22 RX ORDER — LINAGLIPTIN 5 MG/1
5 TABLET, FILM COATED ORAL DAILY
Qty: 30 TABLET | Refills: 1 | Status: SHIPPED | OUTPATIENT
Start: 2023-06-22 | End: 2023-07-18 | Stop reason: SDUPTHER

## 2023-06-22 RX ORDER — METFORMIN HYDROCHLORIDE 500 MG/1
250 TABLET ORAL 2 TIMES DAILY WITH MEALS
Qty: 30 TABLET | Refills: 1 | Status: SHIPPED | OUTPATIENT
Start: 2023-06-22 | End: 2023-07-18 | Stop reason: SDUPTHER

## 2023-06-22 NOTE — TELEPHONE ENCOUNTER
----- Message from Rebecca Perez sent at 6/22/2023 10:01 AM CDT -----  Regarding: med refills  Contact: pt  Type:  RX Refill Request    Who Called:  Mumtaz Levy  Refill or New Rx: refill   RX Name and Strength: metFORMIN (GLUCOPHAGE) 500 MG tablet  How is the patient currently taking it? (ex. 1XDay): 1/2 tab 2x day   Is this a 30 day or 90 day RX:90 day   Preferred Pharmacy with phone number:     [id number] - [address] & [address] [address]  Phone: [phone] Fax: [phone]    Local or Mail Order:local   Ordering Provider: Pau   Would the patient rather a call back or a response via [facility]?  Call back   Best Call Back Number: [phone]   Additional Information:  atorvastatin (LIPITOR) 40 MG tablet 1x day 90 day supply, linaGLIPtin (TRADJENTA) 5 mg Tab tablet 1x day 90 day supply    Pt states that he is out of medication

## 2023-06-27 ENCOUNTER — OFFICE VISIT (OUTPATIENT)
Dept: UROLOGY | Facility: CLINIC | Age: 63
End: 2023-06-27
Payer: COMMERCIAL

## 2023-06-27 VITALS
BODY MASS INDEX: 25.58 KG/M2 | SYSTOLIC BLOOD PRESSURE: 129 MMHG | DIASTOLIC BLOOD PRESSURE: 74 MMHG | HEIGHT: 67 IN | WEIGHT: 163 LBS | HEART RATE: 77 BPM

## 2023-06-27 DIAGNOSIS — R97.20 ELEVATED PSA: Primary | ICD-10-CM

## 2023-06-27 PROCEDURE — 3078F DIAST BP <80 MM HG: CPT | Mod: CPTII,S$GLB,, | Performed by: UROLOGY

## 2023-06-27 PROCEDURE — 3008F PR BODY MASS INDEX (BMI) DOCUMENTED: ICD-10-PCS | Mod: CPTII,S$GLB,, | Performed by: UROLOGY

## 2023-06-27 PROCEDURE — 3078F PR MOST RECENT DIASTOLIC BLOOD PRESSURE < 80 MM HG: ICD-10-PCS | Mod: CPTII,S$GLB,, | Performed by: UROLOGY

## 2023-06-27 PROCEDURE — 1160F PR REVIEW ALL MEDS BY PRESCRIBER/CLIN PHARMACIST DOCUMENTED: ICD-10-PCS | Mod: CPTII,S$GLB,, | Performed by: UROLOGY

## 2023-06-27 PROCEDURE — 99214 OFFICE O/P EST MOD 30 MIN: CPT | Mod: S$GLB,,, | Performed by: UROLOGY

## 2023-06-27 PROCEDURE — 99214 PR OFFICE/OUTPT VISIT, EST, LEVL IV, 30-39 MIN: ICD-10-PCS | Mod: S$GLB,,, | Performed by: UROLOGY

## 2023-06-27 PROCEDURE — 3074F PR MOST RECENT SYSTOLIC BLOOD PRESSURE < 130 MM HG: ICD-10-PCS | Mod: CPTII,S$GLB,, | Performed by: UROLOGY

## 2023-06-27 PROCEDURE — 3074F SYST BP LT 130 MM HG: CPT | Mod: CPTII,S$GLB,, | Performed by: UROLOGY

## 2023-06-27 PROCEDURE — 1159F PR MEDICATION LIST DOCUMENTED IN MEDICAL RECORD: ICD-10-PCS | Mod: CPTII,S$GLB,, | Performed by: UROLOGY

## 2023-06-27 PROCEDURE — 1160F RVW MEDS BY RX/DR IN RCRD: CPT | Mod: CPTII,S$GLB,, | Performed by: UROLOGY

## 2023-06-27 PROCEDURE — 1159F MED LIST DOCD IN RCRD: CPT | Mod: CPTII,S$GLB,, | Performed by: UROLOGY

## 2023-06-27 PROCEDURE — 3008F BODY MASS INDEX DOCD: CPT | Mod: CPTII,S$GLB,, | Performed by: UROLOGY

## 2023-06-27 NOTE — PROGRESS NOTES
Subjective:       Patient ID: Mumtaz Levy is a 63 y.o. male.    Chief Complaint: Elevated PSA      HPI:  63-year-old male with a history of elevated PSA his PSA back in February was 5.79 it is increased now in  to 6.11 no family history of prostate cancer he was originally referred for a 5 mm renal stone which is asymptomatic and we chose to ignore.  He is here for discussion on his elevated PSA    Past Medical History:   Past Medical History:   Diagnosis Date    Diabetes mellitus, type 2     Encounter for blood transfusion     Hyperlipidemia     Hypertension     Peripheral artery disease        Past Surgical Historical:   Past Surgical History:   Procedure Laterality Date    bypass lower extrem Bilateral         Medications:   Medication List with Changes/Refills   Current Medications    AMLODIPINE (NORVASC) 5 MG TABLET    Take 5 mg by mouth.    ASPIRIN (ECOTRIN) 81 MG EC TABLET    Take 81 mg by mouth once daily.     ATORVASTATIN (LIPITOR) 40 MG TABLET    Take 1 tablet (40 mg total) by mouth once daily.    BLOOD SUGAR DIAGNOSTIC (TRUE METRIX GLUCOSE TEST STRIP) STRP    TEST BLOOD GLUCOSE ONCE DAILY AS DIRECTED** TESTING TWICE DAILY PER PT    CICLOPIROX (PENLAC) 8 % SOLN    Apply topically nightly.    LANCETS (MICRO THIN LANCETS) 33 GAUGE MISC    1 lancet by Misc.(Non-Drug; Combo Route) route once daily.    LINAGLIPTIN (TRADJENTA) 5 MG TAB TABLET    Take 1 tablet (5 mg total) by mouth once daily.    METFORMIN (GLUCOPHAGE) 500 MG TABLET    Take 0.5 tablets (250 mg total) by mouth 2 (two) times daily with meals.    TRUE METRIX GLUCOSE METER MISC    USE TO TEST BLOOD SUGAR DAILY AS DIRECTED        Past Social History:   Social History     Socioeconomic History    Marital status:    Tobacco Use    Smoking status: Former     Packs/day: 1.00     Years: 25.00     Pack years: 25.00     Types: Cigarettes     Quit date: 10/24/2018     Years since quittin.6    Smokeless tobacco: Never   Substance and Sexual  Activity    Alcohol use: Yes     Comment: rarely       Allergies:   Review of patient's allergies indicates:   Allergen Reactions    Grass pollen-june grass standard Other (See Comments)     Watery eyes and sneezing    Penicillins         Family History:   Family History   Problem Relation Age of Onset    Heart disease Mother     Diabetes Father         Review of Systems:  Review of Systems   Constitutional:  Negative for activity change and appetite change.   HENT:  Negative for congestion and dental problem.    Eyes:  Negative for visual disturbance.   Respiratory:  Negative for chest tightness and shortness of breath.    Cardiovascular:  Negative for chest pain.   Gastrointestinal:  Negative for abdominal distention and abdominal pain.   Endocrine: Negative for polyuria.   Genitourinary:  Negative for difficulty urinating, dysuria, flank pain, frequency, hematuria, penile discharge, penile pain, penile swelling, scrotal swelling, testicular pain and urgency.   Musculoskeletal:  Negative for back pain and neck pain.   Skin:  Negative for color change.   Allergic/Immunologic: Positive for immunocompromised state.   Neurological:  Negative for dizziness.   Hematological:  Negative for adenopathy.   Psychiatric/Behavioral:  Negative for agitation, behavioral problems and confusion.      Physical Exam:  Physical Exam  Constitutional:       General: He is not in acute distress.     Appearance: He is well-developed.   HENT:      Head: Normocephalic and atraumatic.      Nose: Nose normal.   Eyes:      General: No scleral icterus.     Conjunctiva/sclera: Conjunctivae normal.      Pupils: Pupils are equal, round, and reactive to light.   Neck:      Thyroid: No thyromegaly.      Trachea: No tracheal deviation.   Cardiovascular:      Rate and Rhythm: Normal rate and regular rhythm.      Heart sounds: Normal heart sounds.   Pulmonary:      Effort: Pulmonary effort is normal. No respiratory distress.      Breath sounds: Normal  breath sounds. No wheezing or rales.   Abdominal:      General: Bowel sounds are normal. There is no distension.      Palpations: Abdomen is soft.      Tenderness: There is no abdominal tenderness. There is no guarding or rebound.   Genitourinary:     Penis: Normal. No tenderness.       Prostate: Normal.   Musculoskeletal:         General: No deformity. Normal range of motion.      Cervical back: Neck supple.   Lymphadenopathy:      Cervical: No cervical adenopathy.   Skin:     General: Skin is warm and dry.      Findings: No erythema or rash.   Neurological:      Mental Status: He is alert and oriented to person, place, and time.      Cranial Nerves: No cranial nerve deficit.   Psychiatric:         Behavior: Behavior normal.       Assessment/Plan:       Problem List Items Addressed This Visit    None  Visit Diagnoses       Elevated PSA    -  Primary               Elevated PSA:  We would a long discussion about this his PSA is higher than it should be obviously he has no family history of prostate cancer I have given the patient 3 options   1.  Immediate prostate biopsy  2. Recheck PSA in 3 months and likely then biopsy if his PSA has not gone down considerably  3. MRI of the prostate to determine need for biopsy     Patient chose option to recheck PSA in 3 months we had a discussion about how he will likely be getting a biopsy this year he understands.  Return to clinic in 3 months with PSA

## 2023-06-29 DIAGNOSIS — E11.9 TYPE 2 DIABETES MELLITUS WITHOUT COMPLICATION, WITHOUT LONG-TERM CURRENT USE OF INSULIN: ICD-10-CM

## 2023-06-29 NOTE — TELEPHONE ENCOUNTER
----- Message from Fatou Aleman sent at 6/29/2023  4:44 PM CDT -----  Contact: self  Requesting a call back regarding status of having his glucose test strips refilled - requested in [employer]. Please call back at [phone]

## 2023-07-18 ENCOUNTER — OFFICE VISIT (OUTPATIENT)
Dept: FAMILY MEDICINE | Facility: CLINIC | Age: 63
End: 2023-07-18
Payer: COMMERCIAL

## 2023-07-18 VITALS
HEART RATE: 82 BPM | RESPIRATION RATE: 15 BRPM | OXYGEN SATURATION: 98 % | BODY MASS INDEX: 26.11 KG/M2 | TEMPERATURE: 97 F | HEIGHT: 67 IN | WEIGHT: 166.38 LBS | SYSTOLIC BLOOD PRESSURE: 120 MMHG | DIASTOLIC BLOOD PRESSURE: 80 MMHG

## 2023-07-18 DIAGNOSIS — E11.9 TYPE 2 DIABETES MELLITUS WITHOUT COMPLICATION, WITHOUT LONG-TERM CURRENT USE OF INSULIN: ICD-10-CM

## 2023-07-18 DIAGNOSIS — N18.31 STAGE 3A CHRONIC KIDNEY DISEASE: ICD-10-CM

## 2023-07-18 DIAGNOSIS — Z76.89 ENCOUNTER TO ESTABLISH CARE: Primary | ICD-10-CM

## 2023-07-18 DIAGNOSIS — I10 ESSENTIAL HYPERTENSION: ICD-10-CM

## 2023-07-18 DIAGNOSIS — Z12.11 COLON CANCER SCREENING: ICD-10-CM

## 2023-07-18 DIAGNOSIS — E78.2 MIXED HYPERLIPIDEMIA: ICD-10-CM

## 2023-07-18 PROCEDURE — 1159F PR MEDICATION LIST DOCUMENTED IN MEDICAL RECORD: ICD-10-PCS | Mod: CPTII,S$GLB,, | Performed by: INTERNAL MEDICINE

## 2023-07-18 PROCEDURE — 3074F PR MOST RECENT SYSTOLIC BLOOD PRESSURE < 130 MM HG: ICD-10-PCS | Mod: CPTII,S$GLB,, | Performed by: INTERNAL MEDICINE

## 2023-07-18 PROCEDURE — 3008F PR BODY MASS INDEX (BMI) DOCUMENTED: ICD-10-PCS | Mod: CPTII,S$GLB,, | Performed by: INTERNAL MEDICINE

## 2023-07-18 PROCEDURE — 3079F PR MOST RECENT DIASTOLIC BLOOD PRESSURE 80-89 MM HG: ICD-10-PCS | Mod: CPTII,S$GLB,, | Performed by: INTERNAL MEDICINE

## 2023-07-18 PROCEDURE — 1160F PR REVIEW ALL MEDS BY PRESCRIBER/CLIN PHARMACIST DOCUMENTED: ICD-10-PCS | Mod: CPTII,S$GLB,, | Performed by: INTERNAL MEDICINE

## 2023-07-18 PROCEDURE — 1160F RVW MEDS BY RX/DR IN RCRD: CPT | Mod: CPTII,S$GLB,, | Performed by: INTERNAL MEDICINE

## 2023-07-18 PROCEDURE — 3008F BODY MASS INDEX DOCD: CPT | Mod: CPTII,S$GLB,, | Performed by: INTERNAL MEDICINE

## 2023-07-18 PROCEDURE — 3074F SYST BP LT 130 MM HG: CPT | Mod: CPTII,S$GLB,, | Performed by: INTERNAL MEDICINE

## 2023-07-18 PROCEDURE — 99396 PR PREVENTIVE VISIT,EST,40-64: ICD-10-PCS | Mod: S$GLB,,, | Performed by: INTERNAL MEDICINE

## 2023-07-18 PROCEDURE — 1159F MED LIST DOCD IN RCRD: CPT | Mod: CPTII,S$GLB,, | Performed by: INTERNAL MEDICINE

## 2023-07-18 PROCEDURE — 3079F DIAST BP 80-89 MM HG: CPT | Mod: CPTII,S$GLB,, | Performed by: INTERNAL MEDICINE

## 2023-07-18 PROCEDURE — 99396 PREV VISIT EST AGE 40-64: CPT | Mod: S$GLB,,, | Performed by: INTERNAL MEDICINE

## 2023-07-18 RX ORDER — LINAGLIPTIN 5 MG/1
5 TABLET, FILM COATED ORAL DAILY
Qty: 30 TABLET | Refills: 1 | Status: SHIPPED | OUTPATIENT
Start: 2023-07-18 | End: 2023-09-13

## 2023-07-18 RX ORDER — AMLODIPINE BESYLATE 5 MG/1
5 TABLET ORAL DAILY
Qty: 90 TABLET | Refills: 1 | Status: SHIPPED | OUTPATIENT
Start: 2023-07-18 | End: 2023-12-27 | Stop reason: SDUPTHER

## 2023-07-18 RX ORDER — METFORMIN HYDROCHLORIDE 500 MG/1
250 TABLET ORAL 2 TIMES DAILY WITH MEALS
Qty: 30 TABLET | Refills: 1 | Status: SHIPPED | OUTPATIENT
Start: 2023-07-18 | End: 2023-09-13

## 2023-07-18 RX ORDER — ATORVASTATIN CALCIUM 40 MG/1
40 TABLET, FILM COATED ORAL DAILY
Qty: 90 TABLET | Refills: 1 | Status: SHIPPED | OUTPATIENT
Start: 2023-07-18 | End: 2023-12-20

## 2023-07-18 RX ORDER — ACETAMINOPHEN 500 MG
5000 TABLET ORAL DAILY
COMMUNITY

## 2023-07-18 NOTE — PROGRESS NOTES
Subjective:       Patient ID: Mumtaz Levy is a 63 y.o. male.    Chief Complaint: Establish Care (Former pt. Of Dr. Garcia to est care.  No c/o today.  )    HPI    63 y.o. male with Active Diagnosis Review (HCC)     Chronic             HCC 19 - Diabetes without Complication     Type 2 diabetes mellitus without complication, without long-term current   use of insulin [E11.9]      - Vascular Disease     Peripheral vascular disease [I73.9]      - Chronic Kidney Disease, Moderate (Stage 3)     Stage 3a chronic kidney disease [N18.31]          Suspect             HCC 18 - Diabetes with Chronic Complications     Type 2 diabetes mellitus with hyperglycemia [E11.65]           here to establish care.         Health Maintenance Topics with due status: Not Due       Topic Last Completion Date    Lipid Panel 12/16/2022    TETANUS VACCINE 07/15/2023    Shingles Vaccine 07/15/2023    Low Dose Statin 07/18/2023    Influenza Vaccine Not Due       Health Maintenance Due   Topic Date Due    Hepatitis C Screening  Never done    Diabetes Urine Screening  Never done    Pneumococcal Vaccines (Age 0-64) (1 - PCV) Never done    Foot Exam  Never done    HIV Screening  Never done    LDCT Lung Screen  Never done    COVID-19 Vaccine (2 - Booster for Сергей series) 08/07/2021    Eye Exam  08/19/2022    Colorectal Cancer Screening  06/04/2023    Hemoglobin A1c  06/16/2023         Medical Problems:      Patient Active Problem List   Diagnosis    History of renal calculi    Peripheral vascular disease    Mixed hyperlipidemia    Essential hypertension    Type 2 diabetes mellitus without complication, without long-term current use of insulin    Stage 3a chronic kidney disease       Current Outpatient Medications on File Prior to Visit   Medication Sig Dispense Refill    aspirin (ECOTRIN) 81 MG EC tablet Take 81 mg by mouth once daily.       blood sugar diagnostic (TRUE METRIX GLUCOSE TEST STRIP) Strp TEST BLOOD GLUCOSE ONCE  DAILY AS DIRECTED** TESTING TWICE DAILY PER  strip 0    cholecalciferol, vitamin D3, (VITAMIN D3) 125 mcg (5,000 unit) Tab Take 5,000 Units by mouth once daily.      TRUE METRIX GLUCOSE METER Misc USE TO TEST BLOOD SUGAR DAILY AS DIRECTED  0    [DISCONTINUED] amLODIPine (NORVASC) 5 MG tablet Take 5 mg by mouth.      [DISCONTINUED] atorvastatin (LIPITOR) 40 MG tablet Take 1 tablet (40 mg total) by mouth once daily. 90 tablet 0    [DISCONTINUED] linaGLIPtin (TRADJENTA) 5 mg Tab tablet Take 1 tablet (5 mg total) by mouth once daily. 30 tablet 1    [DISCONTINUED] metFORMIN (GLUCOPHAGE) 500 MG tablet Take 0.5 tablets (250 mg total) by mouth 2 (two) times daily with meals. 30 tablet 1    lancets (MICRO THIN LANCETS) 33 gauge Misc 1 lancet by Misc.(Non-Drug; Combo Route) route once daily. 300 each 3    [DISCONTINUED] ciclopirox (PENLAC) 8 % Soln Apply topically nightly. (Patient not taking: Reported on 2023) 6.6 mL 3     No current facility-administered medications on file prior to visit.           Past Medical History:   Diagnosis Date    Diabetes mellitus, type 2     Encounter for blood transfusion     Hyperlipidemia     Hypertension     Peripheral artery disease      Past Surgical History:   Procedure Laterality Date    bypass lower extrem Bilateral      Family History   Problem Relation Age of Onset    Heart disease Mother     Diabetes Father      Social History     Socioeconomic History    Marital status:    Tobacco Use    Smoking status: Former     Packs/day: 1.00     Years: 25.00     Pack years: 25.00     Types: Cigarettes     Quit date: 10/24/2018     Years since quittin.7     Passive exposure: Past    Smokeless tobacco: Never   Substance and Sexual Activity    Alcohol use: Yes     Comment: rarely    Drug use: Never     Review of patient's allergies indicates:   Allergen Reactions    Bee's [allergen ext-venom-honey bee]     Grass pollen- grass standard Other (See Comments)     Watery eyes  "and sneezing    Penicillins        Current Outpatient Medications:     aspirin (ECOTRIN) 81 MG EC tablet, Take 81 mg by mouth once daily. , Disp: , Rfl:     blood sugar diagnostic (TRUE METRIX GLUCOSE TEST STRIP) Strp, TEST BLOOD GLUCOSE ONCE DAILY AS DIRECTED** TESTING TWICE DAILY PER PT, Disp: 300 strip, Rfl: 0    cholecalciferol, vitamin D3, (VITAMIN D3) 125 mcg (5,000 unit) Tab, Take 5,000 Units by mouth once daily., Disp: , Rfl:     TRUE METRIX GLUCOSE METER Misc, USE TO TEST BLOOD SUGAR DAILY AS DIRECTED, Disp: , Rfl: 0    amLODIPine (NORVASC) 5 MG tablet, Take 1 tablet (5 mg total) by mouth once daily., Disp: 90 tablet, Rfl: 1    atorvastatin (LIPITOR) 40 MG tablet, Take 1 tablet (40 mg total) by mouth once daily., Disp: 90 tablet, Rfl: 1    lancets (MICRO THIN LANCETS) 33 gauge Misc, 1 lancet by Misc.(Non-Drug; Combo Route) route once daily., Disp: 300 each, Rfl: 3    linaGLIPtin (TRADJENTA) 5 mg Tab tablet, Take 1 tablet (5 mg total) by mouth once daily., Disp: 30 tablet, Rfl: 1    metFORMIN (GLUCOPHAGE) 500 MG tablet, Take 0.5 tablets (250 mg total) by mouth 2 (two) times daily with meals., Disp: 30 tablet, Rfl: 1        Review of Systems   Constitutional:  Negative for diaphoresis and fever.   HENT:  Negative for trouble swallowing.    Respiratory:  Negative for cough and shortness of breath.    Cardiovascular:  Negative for chest pain and leg swelling.   Gastrointestinal:  Negative for abdominal pain and blood in stool.   Genitourinary:  Negative for dysuria.   Musculoskeletal:  Negative for gait problem.   Skin:  Negative for pallor.   Neurological:  Negative for syncope and weakness.     Objective:        Vitals:    07/18/23 1337   BP: 120/80   BP Location: Left arm   Patient Position: Sitting   BP Method: Large (Manual)   Pulse: 82   Resp: 15   Temp: 97.3 °F (36.3 °C)   TempSrc: Temporal   SpO2: 98%   Weight: 75.5 kg (166 lb 6.4 oz)   Height: 5' 7" (1.702 m)       Body mass index is 26.06 " kg/m².    Physical Exam  Constitutional:       General: He is not in acute distress.     Appearance: He is well-developed. He is not diaphoretic.   HENT:      Head: Normocephalic and atraumatic.      Right Ear: External ear normal.      Left Ear: External ear normal.   Eyes:      Conjunctiva/sclera: Conjunctivae normal.   Cardiovascular:      Rate and Rhythm: Normal rate and regular rhythm.   Pulmonary:      Effort: Pulmonary effort is normal.      Breath sounds: Normal breath sounds.   Abdominal:      General: Bowel sounds are normal.      Palpations: Abdomen is soft.   Musculoskeletal:      Cervical back: Neck supple. No rigidity.      Right lower leg: No edema.      Left lower leg: No edema.   Skin:     General: Skin is warm and dry.      Nails: There is no clubbing.   Neurological:      General: No focal deficit present.      Mental Status: He is alert.   Psychiatric:         Behavior: Behavior normal.         Judgment: Judgment normal.       Assessment:     1. Encounter to establish care    2. Colon cancer screening    3. Type 2 diabetes mellitus without complication, without long-term current use of insulin    4. Mixed hyperlipidemia    5. Essential hypertension    6. Stage 3a chronic kidney disease           Plan:         1. Encounter to establish care  - reviewed healthcare maintenance and pt's chronic medical problems.   - labs reviewed, ordered  - immunizations reviewed  - CRC screen - due  - Comprehensive Metabolic Panel; Future  - Hemoglobin A1C; Future  - Microalbumin/Creatinine Ratio, Urine  - Comprehensive Metabolic Panel  - Hemoglobin A1C    2. Colon cancer screening    - Cologuard Screening (Multitarget Stool DNA); Future  - Cologuard Screening (Multitarget Stool DNA)    3. Type 2 diabetes mellitus without complication, without long-term current use of insulin  - controlled. Due for eye exam  - Comprehensive Metabolic Panel; Future  - Hemoglobin A1C; Future  - Microalbumin/Creatinine Ratio, Urine  -  Comprehensive Metabolic Panel  - Hemoglobin A1C  - Ambulatory referral/consult to Optometry; Future  - linaGLIPtin (TRADJENTA) 5 mg Tab tablet; Take 1 tablet (5 mg total) by mouth once daily.  Dispense: 30 tablet; Refill: 1  - metFORMIN (GLUCOPHAGE) 500 MG tablet; Take 0.5 tablets (250 mg total) by mouth 2 (two) times daily with meals.  Dispense: 30 tablet; Refill: 1    4. Mixed hyperlipidemia    - atorvastatin (LIPITOR) 40 MG tablet; Take 1 tablet (40 mg total) by mouth once daily.  Dispense: 90 tablet; Refill: 1    5. Essential hypertension    - amLODIPine (NORVASC) 5 MG tablet; Take 1 tablet (5 mg total) by mouth once daily.  Dispense: 90 tablet; Refill: 1    6. Stage 3a chronic kidney disease  - chronic, stable. Monitor w/ labs. Oral hydration. Avoid nsaids.  - Comprehensive Metabolic Panel; Future                Unless there are intervening problems, Follow up in about 6 months (around 1/18/2024), or if symptoms worsen or fail to improve, for annual, follow up, diabetes.      Patient note was created using MModal Dictation.  Any errors in syntax or even information may not have been identified and edited on initial review prior to signing this note.    No LOS data to display        This includes face to face time and non-face to face time preparing to see the patient (eg, review of tests), obtaining and/or reviewing separately obtained history, documenting clinical information in the electronic or other health record, independently interpreting results and communicating results to the patient/family/caregiver, or care coordinator.

## 2023-07-19 LAB
ALBUMIN SERPL BCP-MCNC: 3.8 G/DL (ref 3.4–5)
ALP SERPL-CCNC: 109 U/L (ref 45–117)
ALT SERPL W P-5'-P-CCNC: 35 U/L (ref 16–61)
ANION GAP SERPL CALC-SCNC: 6 MMOL/L (ref 3–11)
AST SERPL-CCNC: 11 U/L (ref 15–37)
BILIRUB SERPL-MCNC: 0.6 MG/DL (ref 0.2–1)
BUN SERPL-MCNC: 29 MG/DL (ref 7–18)
BUN/CREAT SERPL: 19.72 RATIO
CALCIUM SERPL-MCNC: 8.9 MG/DL (ref 8.5–10.1)
CHLORIDE SERPL-SCNC: 105 MMOL/L (ref 98–107)
CO2 SERPL-SCNC: 27 MMOL/L (ref 21–32)
CREAT SERPL-MCNC: 1.47 MG/DL (ref 0.7–1.3)
CREATININE, URINE: 276.6 MG/DL (ref 10–175)
ESTIMATED AVG GLUCOSE: 143 MG/DL
GFR ESTIMATION: 48
GLUCOSE SERPL-MCNC: 154 MG/DL (ref 74–106)
HBA1C MFR BLD: 6.2 % (ref 4.2–6.3)
MICROALBUMIN URINE: 31.8 MG/L (ref 0–20)
MICROALBUMIN/CREATININE RATIO: 11 MG/G (ref 0–30)
POTASSIUM SERPL-SCNC: 4.1 MMOL/L (ref 3.5–5.1)
PROT SERPL-MCNC: 7.4 G/DL (ref 6.4–8.2)
SODIUM BLD-SCNC: 138 MMOL/L (ref 131–143)

## 2023-07-20 NOTE — PROGRESS NOTES
Results released to patient portal.      Your results have been reviewed.  Diabetes is much better controlled, A1c is down to 6.2. continue current med regimen.   Stable kidney function.  Diabetes urine screen is negative (normal).  Please call if you have any questions or concerns.    Sincerely,   Dr. Suarez

## 2023-07-25 ENCOUNTER — TELEPHONE (OUTPATIENT)
Dept: FAMILY MEDICINE | Facility: CLINIC | Age: 63
End: 2023-07-25
Payer: COMMERCIAL

## 2023-07-25 NOTE — TELEPHONE ENCOUNTER
----- Message from Annel Jenkins sent at 7/25/2023 10:25 AM CDT -----  Contact: self  Pt is checking on lab orders status pls call [phone] with update, where was orders sent and what are they for

## 2023-08-07 DIAGNOSIS — E11.9 TYPE 2 DIABETES MELLITUS WITHOUT COMPLICATION, WITHOUT LONG-TERM CURRENT USE OF INSULIN: ICD-10-CM

## 2023-08-11 LAB — NONINV COLON CA DNA+OCC BLD SCRN STL QL: NEGATIVE

## 2023-08-17 ENCOUNTER — PATIENT MESSAGE (OUTPATIENT)
Dept: FAMILY MEDICINE | Facility: CLINIC | Age: 63
End: 2023-08-17
Payer: COMMERCIAL

## 2023-09-25 ENCOUNTER — CLINICAL SUPPORT (OUTPATIENT)
Dept: UROLOGY | Facility: CLINIC | Age: 63
End: 2023-09-25
Payer: COMMERCIAL

## 2023-09-25 DIAGNOSIS — E11.9 TYPE 2 DIABETES MELLITUS WITHOUT COMPLICATION, WITHOUT LONG-TERM CURRENT USE OF INSULIN: ICD-10-CM

## 2023-09-25 DIAGNOSIS — R97.20 ELEVATED PSA: Primary | ICD-10-CM

## 2023-09-25 LAB — PSA, DIAGNOSTIC: 6.73 NG/ML (ref 0.1–4)

## 2023-09-25 PROCEDURE — 36415 COLL VENOUS BLD VENIPUNCTURE: CPT | Mod: S$GLB,,, | Performed by: UROLOGY

## 2023-09-25 PROCEDURE — 36415 PR COLLECTION VENOUS BLOOD,VENIPUNCTURE: ICD-10-PCS | Mod: S$GLB,,, | Performed by: UROLOGY

## 2023-09-25 NOTE — PROGRESS NOTES
Here for PSA draw.  Left A/C with straight stick x 1.  Gauze and coban applied.  Tolerated well.  Aware of next appt.

## 2023-09-26 ENCOUNTER — PATIENT MESSAGE (OUTPATIENT)
Dept: FAMILY MEDICINE | Facility: CLINIC | Age: 63
End: 2023-09-26
Payer: COMMERCIAL

## 2023-09-28 ENCOUNTER — OFFICE VISIT (OUTPATIENT)
Dept: UROLOGY | Facility: CLINIC | Age: 63
End: 2023-09-28
Payer: COMMERCIAL

## 2023-09-28 VITALS — DIASTOLIC BLOOD PRESSURE: 76 MMHG | HEART RATE: 75 BPM | SYSTOLIC BLOOD PRESSURE: 144 MMHG | OXYGEN SATURATION: 96 %

## 2023-09-28 DIAGNOSIS — R97.20 ELEVATED PSA: Primary | ICD-10-CM

## 2023-09-28 PROCEDURE — 3061F NEG MICROALBUMINURIA REV: CPT | Mod: CPTII,S$GLB,, | Performed by: UROLOGY

## 2023-09-28 PROCEDURE — 1159F PR MEDICATION LIST DOCUMENTED IN MEDICAL RECORD: ICD-10-PCS | Mod: CPTII,S$GLB,, | Performed by: UROLOGY

## 2023-09-28 PROCEDURE — 3078F DIAST BP <80 MM HG: CPT | Mod: CPTII,S$GLB,, | Performed by: UROLOGY

## 2023-09-28 PROCEDURE — 3078F PR MOST RECENT DIASTOLIC BLOOD PRESSURE < 80 MM HG: ICD-10-PCS | Mod: CPTII,S$GLB,, | Performed by: UROLOGY

## 2023-09-28 PROCEDURE — 3044F PR MOST RECENT HEMOGLOBIN A1C LEVEL <7.0%: ICD-10-PCS | Mod: CPTII,S$GLB,, | Performed by: UROLOGY

## 2023-09-28 PROCEDURE — 99214 PR OFFICE/OUTPT VISIT, EST, LEVL IV, 30-39 MIN: ICD-10-PCS | Mod: S$GLB,,, | Performed by: UROLOGY

## 2023-09-28 PROCEDURE — 1160F PR REVIEW ALL MEDS BY PRESCRIBER/CLIN PHARMACIST DOCUMENTED: ICD-10-PCS | Mod: CPTII,S$GLB,, | Performed by: UROLOGY

## 2023-09-28 PROCEDURE — 3066F PR DOCUMENTATION OF TREATMENT FOR NEPHROPATHY: ICD-10-PCS | Mod: CPTII,S$GLB,, | Performed by: UROLOGY

## 2023-09-28 PROCEDURE — 3044F HG A1C LEVEL LT 7.0%: CPT | Mod: CPTII,S$GLB,, | Performed by: UROLOGY

## 2023-09-28 PROCEDURE — 99214 OFFICE O/P EST MOD 30 MIN: CPT | Mod: S$GLB,,, | Performed by: UROLOGY

## 2023-09-28 PROCEDURE — 3066F NEPHROPATHY DOC TX: CPT | Mod: CPTII,S$GLB,, | Performed by: UROLOGY

## 2023-09-28 PROCEDURE — 1159F MED LIST DOCD IN RCRD: CPT | Mod: CPTII,S$GLB,, | Performed by: UROLOGY

## 2023-09-28 PROCEDURE — 3077F SYST BP >= 140 MM HG: CPT | Mod: CPTII,S$GLB,, | Performed by: UROLOGY

## 2023-09-28 PROCEDURE — 1160F RVW MEDS BY RX/DR IN RCRD: CPT | Mod: CPTII,S$GLB,, | Performed by: UROLOGY

## 2023-09-28 PROCEDURE — 3077F PR MOST RECENT SYSTOLIC BLOOD PRESSURE >= 140 MM HG: ICD-10-PCS | Mod: CPTII,S$GLB,, | Performed by: UROLOGY

## 2023-09-28 PROCEDURE — 3061F PR NEG MICROALBUMINURIA RESULT DOCUMENTED/REVIEW: ICD-10-PCS | Mod: CPTII,S$GLB,, | Performed by: UROLOGY

## 2023-09-28 NOTE — PROGRESS NOTES
Subjective:       Patient ID: Mumtaz Levy is a 63 y.o. male.    Chief Complaint: Elevated PSA      HPI: 63-year-old male with a history of elevated PSA his PSA back in February was 5.79 it is increased June to 6.11. PSA today is 6.73. no family history of prostate cancer he was originally referred for a 5 mm renal stone which is asymptomatic and we chose to ignore.  He is here for discussion on his elevated PSA       Past Medical History:   Past Medical History:   Diagnosis Date    Diabetes mellitus, type 2     Encounter for blood transfusion     Hyperlipidemia     Hypertension     Peripheral artery disease        Past Surgical Historical:   Past Surgical History:   Procedure Laterality Date    bypass lower extrem Bilateral         Medications:   Medication List with Changes/Refills   Current Medications    AMLODIPINE (NORVASC) 5 MG TABLET    Take 1 tablet (5 mg total) by mouth once daily.    ASPIRIN (ECOTRIN) 81 MG EC TABLET    Take 81 mg by mouth once daily.     ATORVASTATIN (LIPITOR) 40 MG TABLET    Take 1 tablet (40 mg total) by mouth once daily.    BLOOD SUGAR DIAGNOSTIC (TRUE METRIX GLUCOSE TEST STRIP) STRP    TEST BLOOD GLUCOSE twice DAILY AS DIRECTED    CHOLECALCIFEROL, VITAMIN D3, (VITAMIN D3) 125 MCG (5,000 UNIT) TAB    Take 5,000 Units by mouth once daily.    LANCETS (MICRO THIN LANCETS) 33 GAUGE MISC    1 lancet by Misc.(Non-Drug; Combo Route) route once daily.    METFORMIN (GLUCOPHAGE) 500 MG TABLET    TAKE 1/2 TABLET(250 MG) BY MOUTH TWICE DAILY WITH MEALS    TRADJENTA 5 MG TAB TABLET    TAKE 1 TABLET(5 MG) BY MOUTH EVERY DAY    TRUE METRIX GLUCOSE METER MISC    USE TO TEST BLOOD SUGAR DAILY AS DIRECTED        Past Social History:   Social History     Socioeconomic History    Marital status:    Tobacco Use    Smoking status: Former     Current packs/day: 0.00     Average packs/day: 1 pack/day for 25.0 years (25.0 ttl pk-yrs)     Types: Cigarettes     Start date: 10/24/1993     Quit date:  10/24/2018     Years since quittin.9     Passive exposure: Past    Smokeless tobacco: Never   Substance and Sexual Activity    Alcohol use: Yes     Comment: rarely    Drug use: Never       Allergies:   Review of patient's allergies indicates:   Allergen Reactions    Bee's [allergen ext-venom-honey bee]     Grass pollen- grass standard Other (See Comments)     Watery eyes and sneezing    Penicillins         Family History:   Family History   Problem Relation Age of Onset    Heart disease Mother     Diabetes Father         Review of Systems:  Review of Systems    Physical Exam:  Physical Exam    Assessment/Plan:     PSA today is 6.73 we will set patient up for prostate biopsy here in our office.   Problem List Items Addressed This Visit    None         63-year-old male never had a prostate biopsy no family history of prostate cancer although his PSA is increasing somewhat suspicious slowly in 3 months it went from 6.1 to  6.7  We would a discussion about prostate biopsy unfortunately I think it is time for that patient agrees we will proceed with prostate biopsy next available date

## 2023-10-05 ENCOUNTER — PATIENT MESSAGE (OUTPATIENT)
Dept: FAMILY MEDICINE | Facility: CLINIC | Age: 63
End: 2023-10-05
Payer: COMMERCIAL

## 2023-11-14 ENCOUNTER — TELEPHONE (OUTPATIENT)
Dept: FAMILY MEDICINE | Facility: CLINIC | Age: 63
End: 2023-11-14
Payer: COMMERCIAL

## 2023-11-14 DIAGNOSIS — E11.9 TYPE 2 DIABETES MELLITUS WITHOUT COMPLICATION, WITHOUT LONG-TERM CURRENT USE OF INSULIN: ICD-10-CM

## 2023-11-14 RX ORDER — METFORMIN HYDROCHLORIDE 500 MG/1
TABLET ORAL
Qty: 90 TABLET | Refills: 1 | Status: SHIPPED | OUTPATIENT
Start: 2023-11-14 | End: 2023-12-27 | Stop reason: SDUPTHER

## 2023-11-14 NOTE — TELEPHONE ENCOUNTER
Pt. Requesting 90 day supply of Metformin.   Request for clinical note on 4/10/23, faxed through the system

## 2023-12-08 ENCOUNTER — TELEPHONE (OUTPATIENT)
Dept: ADMINISTRATIVE | Facility: HOSPITAL | Age: 63
End: 2023-12-08
Payer: COMMERCIAL

## 2023-12-08 ENCOUNTER — PATIENT OUTREACH (OUTPATIENT)
Dept: ADMINISTRATIVE | Facility: HOSPITAL | Age: 63
End: 2023-12-08
Payer: COMMERCIAL

## 2023-12-08 ENCOUNTER — PATIENT MESSAGE (OUTPATIENT)
Dept: ADMINISTRATIVE | Facility: HOSPITAL | Age: 63
End: 2023-12-08
Payer: COMMERCIAL

## 2023-12-08 DIAGNOSIS — R97.20 ELEVATED PSA: Primary | ICD-10-CM

## 2023-12-08 NOTE — TELEPHONE ENCOUNTER
Pt said  had wanted to do a procedure on him and he has not heard from anyone.  Requests someone call to assist scheduling appt.

## 2023-12-08 NOTE — PROGRESS NOTES
Working HTN Report.    Requested an updated bp reading. Said he has a machine at home, but is not there. Told him I would send pt portal message and if he would please respond to that with the reading. He was agreeable with plan.

## 2023-12-08 NOTE — TELEPHONE ENCOUNTER
Spoke with pt. We will proceed with prostate biopsy in the office. Order placed. Caro will contact pt.

## 2023-12-12 ENCOUNTER — TELEPHONE (OUTPATIENT)
Dept: UROLOGY | Facility: CLINIC | Age: 63
End: 2023-12-12
Payer: COMMERCIAL

## 2023-12-12 NOTE — TELEPHONE ENCOUNTER
Noted pt on ASA and has appointment for TRUS BX soon. Called and confirmed, he is on ASA for PVD by Micha Farah. I explained we would contact Dr Farah for clearance but he should keep consent/education apt for 12/13/23.

## 2023-12-13 ENCOUNTER — TELEPHONE (OUTPATIENT)
Dept: UROLOGY | Facility: CLINIC | Age: 63
End: 2023-12-13

## 2023-12-13 ENCOUNTER — CLINICAL SUPPORT (OUTPATIENT)
Dept: UROLOGY | Facility: CLINIC | Age: 63
End: 2023-12-13
Payer: COMMERCIAL

## 2023-12-13 DIAGNOSIS — R97.20 ELEVATED PSA: Primary | ICD-10-CM

## 2023-12-13 RX ORDER — ZOSTER VACCINE RECOMBINANT, ADJUVANTED 50 MCG/0.5
KIT INTRAMUSCULAR
COMMUNITY
Start: 2023-07-13

## 2023-12-13 NOTE — PROGRESS NOTES
Consents and education completed for TRUS BX at [facility] on 12/28/23. Pt given highlighted written instructions. Pt states understanding of all verbal and written instructions.    Clearance put earlier this week when noted pt on asa for PVD. Pt aware about the asa and clearance request.

## 2023-12-13 NOTE — TELEPHONE ENCOUNTER
Spoke with pt and informed him that he is ok to come off of his thinners for biopsy. Pt verbalized understanding.     ----- Message from Corry Pichardo sent at 12/13/2023  1:33 PM CST -----  Pt's clearance is scanned into chart please read over and call pt

## 2023-12-14 RX ORDER — DIAZEPAM 10 MG/1
10 TABLET ORAL ONCE
Qty: 1 TABLET | Refills: 0 | Status: SHIPPED | OUTPATIENT
Start: 2023-12-14 | End: 2023-12-14

## 2023-12-14 RX ORDER — CIPROFLOXACIN 500 MG/1
500 TABLET ORAL 2 TIMES DAILY
Qty: 8 TABLET | Refills: 0 | Status: SHIPPED | OUTPATIENT
Start: 2023-12-14 | End: 2023-12-18

## 2023-12-20 ENCOUNTER — TELEPHONE (OUTPATIENT)
Dept: PHARMACY | Facility: CLINIC | Age: 63
End: 2023-12-20
Payer: COMMERCIAL

## 2023-12-20 DIAGNOSIS — E11.9 TYPE 2 DIABETES MELLITUS WITHOUT COMPLICATION: ICD-10-CM

## 2023-12-27 ENCOUNTER — TELEPHONE (OUTPATIENT)
Dept: UROLOGY | Facility: CLINIC | Age: 63
End: 2023-12-27
Payer: COMMERCIAL

## 2023-12-27 DIAGNOSIS — E11.9 TYPE 2 DIABETES MELLITUS WITHOUT COMPLICATION, WITHOUT LONG-TERM CURRENT USE OF INSULIN: ICD-10-CM

## 2023-12-27 DIAGNOSIS — R97.20 ELEVATED PSA: Primary | ICD-10-CM

## 2023-12-27 DIAGNOSIS — I10 ESSENTIAL HYPERTENSION: ICD-10-CM

## 2023-12-27 RX ORDER — AMLODIPINE BESYLATE 5 MG/1
5 TABLET ORAL DAILY
Qty: 90 TABLET | Refills: 1 | Status: SHIPPED | OUTPATIENT
Start: 2023-12-27

## 2023-12-27 RX ORDER — METFORMIN HYDROCHLORIDE 500 MG/1
TABLET ORAL
Qty: 90 TABLET | Refills: 1 | Status: SHIPPED | OUTPATIENT
Start: 2023-12-27

## 2023-12-27 NOTE — PATIENT INSTRUCTIONS
Patient Education       Prostate Biopsy Discharge Instructions   About this topic   The prostate is a part of your body that helps make semen. The prostate is located at the base of the penis and in front of the rectum.  Prostate biopsy is done:  To help your doctor know if the lump or tumor in your prostate is cancer or not.  If your blood test, called PSA or prostate specific antigen, is high. High PSA in the blood means disease in the prostate.  During a prostate biopsy, the doctor uses a needle to collect pieces of tissue from the prostate. The doctor sends the tissue to the lab. The lab then checks the tissue for infection or cancer.     What care is needed at home?   Ask your doctor what you need to do when you go home. Make sure you ask questions if you do not understand what the doctor says. This way you will know what you need to do.  Rest after the procedure to prevent bleeding from the biopsy site. Avoid activities like heavy lifting and hard exercise.  You may see some blood in your urine or stools for the next few days. You may also have blood in your semen for a few weeks.  Drink up to 8 glasses of water a day to help flush out blood.  Use an ice pack to help with the pain and to help stop the bleeding for the first 2 days after the procedure. Place an ice pack or a bag of frozen peas wrapped in a towel over the painful part. Never put ice right on the skin. Do not leave the ice on more than 10 to 15 minutes at a time. Use ice each hour as needed.  Keep your rectal opening and penis clean to prevent infection.  Keep your wound dry for the next 24 hours. Ask your doctor about when you may take a bath or shower.  Change the dressing if it gets soaked.  What follow-up care is needed?   Your doctor may ask you to make visits to the office to check on your progress. Be sure to keep your visits.  It may take up to 2 weeks for your doctor to get the results. You may be asked to return to the doctor's office  for the result of the biopsy in 2 to 3 weeks. The results will help your doctor understand what kind of problem you have with your prostate. Together you can make a plan for more care.  Your doctor will talk with you if any other treatment is needed.  What drugs may be needed?   The doctor may order drugs to:  Help with pain  Prevent infection  Will physical activity be limited?   Avoid doing activities that may put pressure on your rectal area for the next 7 days. You may be more comfortable if you do not ride a bike, horse, or motorcycle.  Limit your sexual activity for a few days after the procedure. Ask your doctor when you can have sex.  Do not strain when going to the bathroom. Don't hold your urine. Holding back from urinating can irritate your bladder and lead to a urinary tract infection.  Avoid constipation by eating foods high in fiber and staying hydrated. Straining to pass stool can worsen your symptoms as you heal.  What problems could happen?   Infection at the biopsy site  Infection elsewhere in your body  Bleeding from your rectum, or blood in your urine or semen  Tumor spread  Very bad pain  Bladder or rectum perforation  Urinary tract infection  Trouble passing urine  Erectile dysfunction  Reduced sexual activity  When do I need to call the doctor?   Signs of infection like fever of 100.4°F (38°C) or higher, chills, burning or pain when you pass urine.  No urine or more problems passing urine  Dizziness, confusion, or weakness  Yellowish, greenish, or bloody discharge from the penis  Lots of rectal bleeding or large amounts of blood in the urine  Pain does not go away even after taking your drugs  Teach Back: Helping You Understand   The Teach Back Method helps you understand the information we are giving you. After you talk with the staff, tell them in your own words what you learned. This helps to make sure the staff has described each thing clearly. It also helps to explain things that may have  been confusing. Before going home, make sure you can do these:  I can tell you about my procedure.  I can tell you what may help ease my pain.  I can tell you what I will do if I have a fever, chills, problems passing urine, or drainage from my penis.  NO STRENUOUS OR SEXUAL ACTIVITY FOR 3 DAYS  NO LIFTING GREATER THAN 10 LBS      Where can I learn more?   American Cancer Society  https://www.cancer.org/cancer/prostate-cancer/otrzgoeql-ocjoyuyqf-xytulvx/how-diagnosed.html   [employer] of [place]  https://www.radiologyinfo.org/en/info.cfm?pg=prostate-biopsy   Last Reviewed Date   2021-05-18  Consumer Information Use and Disclaimer   This information is not specific medical advice and does not replace information you receive from your health care provider. This is only a brief summary of general information. It does NOT include all information about conditions, illnesses, injuries, tests, procedures, treatments, therapies, discharge instructions or life-style choices that may apply to you. You must talk with your health care provider for complete information about your health and treatment options. This information should not be used to decide whether or not to accept your health care providers advice, instructions or recommendations. Only your health care provider has the knowledge and training to provide advice that is right for you.  Copyright   Copyright © 2021 [employer] and its affiliates and/or licensors. All rights reserved.

## 2023-12-28 ENCOUNTER — PROCEDURE VISIT (OUTPATIENT)
Dept: UROLOGY | Facility: CLINIC | Age: 63
End: 2023-12-28
Payer: COMMERCIAL

## 2023-12-28 VITALS
DIASTOLIC BLOOD PRESSURE: 80 MMHG | OXYGEN SATURATION: 94 % | BODY MASS INDEX: 26.78 KG/M2 | HEART RATE: 80 BPM | WEIGHT: 171 LBS | SYSTOLIC BLOOD PRESSURE: 157 MMHG

## 2023-12-28 DIAGNOSIS — R97.20 ELEVATED PSA: Primary | ICD-10-CM

## 2023-12-28 PROCEDURE — 76872 US TRANSRECTAL: CPT | Mod: S$GLB,,, | Performed by: UROLOGY

## 2023-12-28 PROCEDURE — 55700 TRANSRECTAL ULTRASOUND W/ BIOPSY: CPT | Mod: S$GLB,,, | Performed by: UROLOGY

## 2023-12-28 PROCEDURE — 55700 TRANSRECTAL ULTRASOUND W/ BIOPSY: ICD-10-PCS | Mod: S$GLB,,, | Performed by: UROLOGY

## 2023-12-28 PROCEDURE — 76872 TRANSRECTAL ULTRASOUND W/ BIOPSY: ICD-10-PCS | Mod: S$GLB,,, | Performed by: UROLOGY

## 2023-12-28 NOTE — PROCEDURES
"Transrectal Ultrasound w/ Biopsy    Date/Time: 12/28/2023 11:00 AM    Performed by: Jorge Maguire MD  Authorized by: Jorge Maguire MD    Consent Done?:  Yes (Written)  Time out: Immediately prior to procedure a "time out" was called to verify the correct patient, procedure, equipment, support staff and site/side marked as required.    Indications: Elevated PSA    Position:  Left lateral  Anesthesia:  Pudendal nerve block  Patient sedated: No    Prostate Size:  81G  Left Base Biopsies: 2  Left Mid Biopsies: 2  Left [place] Biopsies: 2  Right Base Biopsies: 2  Right Mid Biopsies: 2  Right [place] Biopsies: 2  Total Biopsies:  12    Patient tolerance:  Patient tolerated the procedure well with no immediate complications     Patient was brought to the procedure room placed on the table in left lateral decubitus position lidocaine jelly was instilled into the rectum the ultrasound probe was advanced to level of prostate and a total of 10 cc of lidocaine was injected into the bilateral elyse prostatic fossa.  A standard 12 core prostate biopsy was obtained patient tolerated the procedure well there were no complications    "

## 2024-01-03 ENCOUNTER — TELEPHONE (OUTPATIENT)
Dept: UROLOGY | Facility: CLINIC | Age: 64
End: 2024-01-03
Payer: COMMERCIAL

## 2024-01-07 DIAGNOSIS — E11.9 TYPE 2 DIABETES MELLITUS WITHOUT COMPLICATION, WITHOUT LONG-TERM CURRENT USE OF INSULIN: ICD-10-CM

## 2024-01-08 ENCOUNTER — PATIENT MESSAGE (OUTPATIENT)
Dept: FAMILY MEDICINE | Facility: CLINIC | Age: 64
End: 2024-01-08
Payer: COMMERCIAL

## 2024-01-09 RX ORDER — LINAGLIPTIN 5 MG/1
5 TABLET, FILM COATED ORAL
Qty: 30 TABLET | Refills: 0 | Status: SHIPPED | OUTPATIENT
Start: 2024-01-09 | End: 2024-02-20

## 2024-01-18 ENCOUNTER — OFFICE VISIT (OUTPATIENT)
Dept: FAMILY MEDICINE | Facility: CLINIC | Age: 64
End: 2024-01-18
Payer: COMMERCIAL

## 2024-01-18 VITALS
HEART RATE: 69 BPM | SYSTOLIC BLOOD PRESSURE: 136 MMHG | TEMPERATURE: 98 F | HEIGHT: 67 IN | RESPIRATION RATE: 15 BRPM | BODY MASS INDEX: 27.02 KG/M2 | OXYGEN SATURATION: 98 % | WEIGHT: 172.19 LBS | DIASTOLIC BLOOD PRESSURE: 80 MMHG

## 2024-01-18 DIAGNOSIS — N18.31 STAGE 3A CHRONIC KIDNEY DISEASE: ICD-10-CM

## 2024-01-18 DIAGNOSIS — E11.22 TYPE 2 DIABETES MELLITUS WITH STAGE 3A CHRONIC KIDNEY DISEASE, WITHOUT LONG-TERM CURRENT USE OF INSULIN: ICD-10-CM

## 2024-01-18 DIAGNOSIS — E11.65 TYPE 2 DIABETES MELLITUS WITH HYPERGLYCEMIA, WITHOUT LONG-TERM CURRENT USE OF INSULIN: ICD-10-CM

## 2024-01-18 DIAGNOSIS — N18.31 TYPE 2 DIABETES MELLITUS WITH STAGE 3A CHRONIC KIDNEY DISEASE, WITHOUT LONG-TERM CURRENT USE OF INSULIN: ICD-10-CM

## 2024-01-18 DIAGNOSIS — I73.9 PERIPHERAL VASCULAR DISEASE: ICD-10-CM

## 2024-01-18 DIAGNOSIS — Z00.00 ANNUAL PHYSICAL EXAM: Primary | ICD-10-CM

## 2024-01-18 DIAGNOSIS — H93.13 TINNITUS OF BOTH EARS: ICD-10-CM

## 2024-01-18 DIAGNOSIS — Z12.2 ENCOUNTER FOR SCREENING FOR LUNG CANCER: ICD-10-CM

## 2024-01-18 PROCEDURE — 3075F SYST BP GE 130 - 139MM HG: CPT | Mod: CPTII,S$GLB,, | Performed by: INTERNAL MEDICINE

## 2024-01-18 PROCEDURE — 99396 PREV VISIT EST AGE 40-64: CPT | Mod: S$GLB,,, | Performed by: INTERNAL MEDICINE

## 2024-01-18 PROCEDURE — 1160F RVW MEDS BY RX/DR IN RCRD: CPT | Mod: CPTII,S$GLB,, | Performed by: INTERNAL MEDICINE

## 2024-01-18 PROCEDURE — 1159F MED LIST DOCD IN RCRD: CPT | Mod: CPTII,S$GLB,, | Performed by: INTERNAL MEDICINE

## 2024-01-18 PROCEDURE — 3008F BODY MASS INDEX DOCD: CPT | Mod: CPTII,S$GLB,, | Performed by: INTERNAL MEDICINE

## 2024-01-18 PROCEDURE — 3079F DIAST BP 80-89 MM HG: CPT | Mod: CPTII,S$GLB,, | Performed by: INTERNAL MEDICINE

## 2024-01-18 RX ORDER — ATORVASTATIN CALCIUM 40 MG/1
40 TABLET, FILM COATED ORAL NIGHTLY
COMMUNITY
Start: 2023-12-20

## 2024-01-18 NOTE — PROGRESS NOTES
"  Subjective:       Patient ID: Mumtaz Levy is a 63 y.o. male.    Chief Complaint: Annual Exam (Pt. Here for annual and 6mth f/u for DM2.  C/o "ringing in both ears" getting worse, x 2 yrs.)    HPI    63 y.o. male with Active Diagnosis Review (HCC)     Chronic             HCC 18 - Diabetes with Chronic Complications     Type 2 diabetes mellitus with hyperglycemia, without long-term current   use of insulin [E11.65]     Type 2 diabetes mellitus with stage 3a chronic kidney disease, without   long-term current use of insulin [E11.22, N18.31]     HCC 19 - Diabetes without Complication     Type 2 diabetes mellitus without complication, without long-term current   use of insulin [E11.9]      - Vascular Disease     Peripheral vascular disease [I73.9]      - Chronic Kidney Disease, Moderate (Stage 3)     Stage 3a chronic kidney disease [N18.31]     Type 2 diabetes mellitus with stage 3a chronic kidney disease, without   long-term current use of insulin [E11.22, N18.31]            here for annual exam.         Health Maintenance Topics with due status: Not Due       Topic Last Completion Date    TETANUS VACCINE 07/15/2023    Diabetes Urine Screening 07/19/2023    Colorectal Cancer Screening 08/02/2023    Eye Exam 08/17/2023    High Dose Statin 12/20/2023    Foot Exam 01/18/2024       Health Maintenance Due   Topic Date Due    Hepatitis C Screening  Never done    Pneumococcal Vaccines (Age 0-64) (1 - PCV) Never done    HIV Screening  Never done    LDCT Lung Screen  Never done    RSV Vaccine (Age 60+ and Pregnant patients) (1 - 1-dose 60+ series) Never done    Influenza Vaccine (1) Never done    Lipid Panel  12/16/2023    Hemoglobin A1c  01/19/2024         Medical Problems:        Past Medical History:   Diagnosis Date    Diabetes mellitus, type 2     Elevated PSA     Encounter for blood transfusion     Hyperlipidemia     Hypertension     Peripheral artery disease      Past Surgical History:   Procedure " Laterality Date    bypass lower extrem Bilateral      Family History   Problem Relation Age of Onset    Heart disease Mother     Diabetes Father      Social History     Socioeconomic History    Marital status:    Tobacco Use    Smoking status: Former     Current packs/day: 0.00     Average packs/day: 1 pack/day for 25.0 years (25.0 ttl pk-yrs)     Types: Cigarettes     Start date: 10/24/1993     Quit date: 10/24/2018     Years since quittin.2     Passive exposure: Past    Smokeless tobacco: Never   Substance and Sexual Activity    Alcohol use: Yes     Comment: rarely    Drug use: Never     Review of patient's allergies indicates:   Allergen Reactions    Bee's [allergen ext-venom-honey bee]     Grass pollen- grass standard Other (See Comments)     Watery eyes and sneezing    Penicillins        Current Outpatient Medications:     amLODIPine (NORVASC) 5 MG tablet, Take 1 tablet (5 mg total) by mouth once daily., Disp: 90 tablet, Rfl: 1    atorvastatin (LIPITOR) 40 MG tablet, Take 40 mg by mouth every evening., Disp: , Rfl:     blood sugar diagnostic (TRUE METRIX GLUCOSE TEST STRIP) Strp, TEST BLOOD GLUCOSE twice DAILY AS DIRECTED, Disp: 300 strip, Rfl: 0    cholecalciferol, vitamin D3, (VITAMIN D3) 125 mcg (5,000 unit) Tab, Take 5,000 Units by mouth once daily., Disp: , Rfl:     lancets (MICRO THIN LANCETS) 33 gauge Misc, 1 lancet by Misc.(Non-Drug; Combo Route) route once daily., Disp: 300 each, Rfl: 3    linaGLIPtin (TRADJENTA) 5 mg Tab tablet, TAKE 1 TABLET(5 MG) BY MOUTH EVERY DAY, Disp: 30 tablet, Rfl: 0    metFORMIN (GLUCOPHAGE) 500 MG tablet, TAKE 1/2 TABLET(250 MG) BY MOUTH TWICE DAILY WITH MEALS, Disp: 90 tablet, Rfl: 1    TRUE METRIX GLUCOSE METER Misc, USE TO TEST BLOOD SUGAR DAILY AS DIRECTED, Disp: , Rfl: 0    aspirin (ECOTRIN) 81 MG EC tablet, Take 81 mg by mouth once daily. , Disp: , Rfl:     diazePAM (VALIUM) 10 MG Tab, Take 1 tablet (10 mg total) by mouth once. for 1 dose, Disp: 1  "tablet, Rfl: 0    SHINGRIX, PF, 50 mcg/0.5 mL injection, , Disp: , Rfl:         Review of Systems   Constitutional:  Negative for diaphoresis and fever.   HENT:  Positive for tinnitus. Negative for hearing loss and trouble swallowing.    Respiratory:  Negative for cough and shortness of breath.    Cardiovascular:  Negative for chest pain and leg swelling.   Gastrointestinal:  Negative for abdominal pain and blood in stool.   Musculoskeletal:  Negative for gait problem.   Skin:  Negative for pallor.   Neurological:  Negative for dizziness, syncope and weakness.       Objective:        Vitals:    01/18/24 1334   BP: 136/80   BP Location: Left arm   Patient Position: Sitting   BP Method: Large (Manual)   Pulse: 69   Resp: 15   Temp: 97.5 °F (36.4 °C)   TempSrc: Temporal   SpO2: 98%   Weight: 78.1 kg (172 lb 3.2 oz)   Height: 5' 7" (1.702 m)       Body mass index is 26.97 kg/m².    Physical Exam  Constitutional:       General: He is not in acute distress.     Appearance: He is well-developed. He is not diaphoretic.   HENT:      Head: Normocephalic and atraumatic.      Right Ear: External ear normal.      Left Ear: External ear normal.   Eyes:      Conjunctiva/sclera: Conjunctivae normal.   Cardiovascular:      Rate and Rhythm: Normal rate and regular rhythm.      Pulses:           Dorsalis pedis pulses are 2+ on the right side and 2+ on the left side.   Pulmonary:      Effort: Pulmonary effort is normal.      Breath sounds: Normal breath sounds.   Abdominal:      General: Bowel sounds are normal.      Palpations: Abdomen is soft.   Musculoskeletal:      Cervical back: Neck supple. No rigidity.      Right lower leg: No edema.      Left lower leg: No edema.   Feet:      Right foot:      Protective Sensation: 5 sites tested.  5 sites sensed.      Skin integrity: No ulcer, blister or skin breakdown.      Left foot:      Protective Sensation: 5 sites tested.  5 sites sensed.      Skin integrity: No ulcer, blister or skin " breakdown.      Toenail Condition: Fungal disease present.  Skin:     General: Skin is warm and dry.      Nails: There is no clubbing.   Neurological:      General: No focal deficit present.      Mental Status: He is alert.   Psychiatric:         Behavior: Behavior normal.         Judgment: Judgment normal.         Assessment:     1. Annual physical exam    2. Stage 3a chronic kidney disease    3. Peripheral vascular disease    4. Type 2 diabetes mellitus with hyperglycemia, without long-term current use of insulin    5. Type 2 diabetes mellitus with stage 3a chronic kidney disease, without long-term current use of insulin    6. Tinnitus of both ears    7. Encounter for screening for lung cancer           Plan:         1. Annual physical exam  - labs - fasting    - immunizations reviewed  - CRC screen utd  - CBC Auto Differential  - Comprehensive Metabolic Panel  - Hemoglobin A1C  - Lipid Panel  - TSH  - T4, Free  - Microalbumin/Creatinine Ratio, Urine    2. Stage 3a chronic kidney disease  - chronic, stable. Monitor w/ labs. Oral hydration. Avoid nsaids.  - Comprehensive Metabolic Panel    3. Peripheral vascular disease  - chronic, stable. Managed by cardiologist Dr. Tirado. On statin.    4. Type 2 diabetes mellitus with hyperglycemia, without long-term current use of insulin  - chronic, improved. Continue current meds, further management pending results.   - eye exam utd  - foot exam today  - Hemoglobin A1C  - Microalbumin/Creatinine Ratio, Urine    5. Type 2 diabetes mellitus with stage 3a chronic kidney disease, without long-term current use of insulin  - chronic, improved. Continue current meds, further management pending results.   - eye exam utd  - foot exam today  - Hemoglobin A1C  - Microalbumin/Creatinine Ratio, Urine    6. Tinnitus of both ears    - Ambulatory referral/consult to ENT; Future    7. Encounter for screening for lung cancer  - discussed r/b of screening w/ pt. Discussed screening  recommendations, imaging, etc. He elects to proceed w/ screening. Fhx lung ca in paternal grandfather.  - CT Chest Lung Screening Low Dose; Future              Unless there are intervening problems, Follow up in about 6 months (around 7/18/2024) for follow up, diabetes.      Patient note was created using MModal Dictation.  Any errors in syntax or even information may not have been identified and edited on initial review prior to signing this note.

## 2024-01-19 LAB
ABS NRBC COUNT: 0 THOU/UL (ref 0–0.01)
ABSOLUTE BASOPHIL: 0.1 10*3/UL (ref 0–0.3)
ABSOLUTE EOSINOPHIL: 0.1 10*3/UL (ref 0–0.6)
ABSOLUTE IMMATURE GRAN: 0.07 THOU/UL (ref 0–0.03)
ABSOLUTE LYMPHOCYTE: 2.6 10*3/UL (ref 1.2–4)
ABSOLUTE MONOCYTE: 0.7 10*3/UL (ref 0.1–0.8)
ALBUMIN SERPL BCP-MCNC: 3.6 G/DL (ref 3.4–5)
ALP SERPL-CCNC: 98 U/L (ref 45–117)
ALT SERPL W P-5'-P-CCNC: 54 U/L (ref 16–61)
ANION GAP SERPL CALC-SCNC: 5 MMOL/L (ref 3–11)
AST SERPL-CCNC: 17 U/L (ref 15–37)
BASOPHILS NFR BLD: 0.6 % (ref 0–3)
BILIRUB SERPL-MCNC: 0.4 MG/DL (ref 0.2–1)
BUN SERPL-MCNC: 23 MG/DL (ref 7–18)
BUN/CREAT SERPL: 14.64 RATIO
CALCIUM SERPL-MCNC: 9.4 MG/DL (ref 8.5–10.1)
CHLORIDE SERPL-SCNC: 111 MMOL/L (ref 98–107)
CHOLEST SERPL-MSCNC: 122 MG/DL
CO2 SERPL-SCNC: 23 MMOL/L (ref 21–32)
CREAT SERPL-MCNC: 1.57 MG/DL (ref 0.7–1.3)
CREATININE, URINE: 128.2 MG/DL (ref 10–175)
EOSINOPHIL NFR BLD: 0.9 % (ref 0–6)
ERYTHROCYTE [DISTWIDTH] IN BLOOD BY AUTOMATED COUNT: 12.6 % (ref 0–15.5)
ESTIMATED AVG GLUCOSE: 154 MG/DL
GFR ESTIMATION: 45
GLUCOSE SERPL-MCNC: 127 MG/DL (ref 74–106)
HBA1C MFR BLD: 6.5 % (ref 4.2–6.3)
HCT VFR BLD AUTO: 42.5 % (ref 42–52)
HDLC SERPL-MCNC: 34 MG/DL
HGB BLD-MCNC: 14.1 G/DL (ref 14–18)
IMMATURE GRANULOCYTES: 0.8 % (ref 0–0.43)
LDLC SERPL CALC-MCNC: 63 MG/DL
LYMPHOCYTES NFR BLD: 29.3 % (ref 20–45)
MCH RBC QN AUTO: 28.5 PG (ref 27–32)
MCHC RBC AUTO-ENTMCNC: 33.2 % (ref 32–36)
MCV RBC AUTO: 85.9 FL (ref 80–97)
MICROALBUMIN URINE: 21.2 MG/L (ref 0–20)
MICROALBUMIN/CREATININE RATIO: 16 MG/G (ref 0–30)
MONOCYTES NFR BLD: 8.3 % (ref 2–10)
NEUTROPHILS # BLD AUTO: 5.4 10*3/UL (ref 1.4–7)
NEUTROPHILS NFR BLD: 60.1 % (ref 50–80)
NUCLEATED RED BLOOD CELLS: 0 % (ref 0–0.2)
PLATELETS: 217 10*3/UL (ref 130–400)
PMV BLD AUTO: 10 FL (ref 9.2–12.2)
POTASSIUM SERPL-SCNC: 4.1 MMOL/L (ref 3.5–5.1)
PROT SERPL-MCNC: 7.2 G/DL (ref 6.4–8.2)
RBC # BLD AUTO: 4.95 10*6/UL (ref 4.7–6.1)
SODIUM BLD-SCNC: 139 MMOL/L (ref 131–143)
T4 FREE SP9 P CHAL SERPL-SCNC: 1.09 NG/DL (ref 0.76–1.46)
TRIGL SERPL-MCNC: 125 MG/DL (ref 0–149)
TSH SERPL DL<=0.005 MIU/L-ACNC: 3.59 UIU/ML (ref 0.36–3.74)
VLDL CHOLESTEROL: 25 MG/DL
WBC # BLD: 8.9 10*3/UL (ref 4.5–10)

## 2024-01-23 NOTE — PROGRESS NOTES
Results released to patient portal.      Your results have been reviewed.  A1c is in good range- continue current diabetes medications.  Cholesterol is good on atorvastatin- continue.  Kidney function stable.  Normal liver enzymes.   Normal blood count.   Thyroid labs in good range.   Please call if you have any questions or concerns.    Sincerely,   Dr. Suarez

## 2024-01-24 ENCOUNTER — TELEPHONE (OUTPATIENT)
Dept: PHARMACY | Facility: CLINIC | Age: 64
End: 2024-01-24
Payer: COMMERCIAL

## 2024-02-17 DIAGNOSIS — E11.9 TYPE 2 DIABETES MELLITUS WITHOUT COMPLICATION, WITHOUT LONG-TERM CURRENT USE OF INSULIN: ICD-10-CM

## 2024-02-20 RX ORDER — LINAGLIPTIN 5 MG/1
5 TABLET, FILM COATED ORAL DAILY
Qty: 90 TABLET | Refills: 1 | Status: SHIPPED | OUTPATIENT
Start: 2024-02-20

## 2024-04-23 DIAGNOSIS — E11.9 TYPE 2 DIABETES MELLITUS WITHOUT COMPLICATION, WITHOUT LONG-TERM CURRENT USE OF INSULIN: ICD-10-CM

## 2024-04-24 RX ORDER — CALCIUM CITRATE/VITAMIN D3 200MG-6.25
TABLET ORAL
Qty: 300 STRIP | Refills: 0 | Status: SHIPPED | OUTPATIENT
Start: 2024-04-24

## 2024-05-02 ENCOUNTER — TELEPHONE (OUTPATIENT)
Dept: FAMILY MEDICINE | Facility: CLINIC | Age: 64
End: 2024-05-02
Payer: COMMERCIAL

## 2024-06-22 DIAGNOSIS — I10 ESSENTIAL HYPERTENSION: ICD-10-CM

## 2024-06-24 RX ORDER — AMLODIPINE BESYLATE 5 MG/1
5 TABLET ORAL
Qty: 90 TABLET | Refills: 0 | Status: SHIPPED | OUTPATIENT
Start: 2024-06-24

## 2024-07-03 ENCOUNTER — CLINICAL SUPPORT (OUTPATIENT)
Dept: UROLOGY | Facility: CLINIC | Age: 64
End: 2024-07-03
Payer: COMMERCIAL

## 2024-07-03 DIAGNOSIS — R97.20 ELEVATED PSA: Primary | ICD-10-CM

## 2024-07-03 DIAGNOSIS — N20.0 KIDNEY STONE: ICD-10-CM

## 2024-07-03 LAB
CREAT SERPL-MCNC: 1.51 MG/DL (ref 0.7–1.3)
GFR ESTIMATION: 47
PSA, DIAGNOSTIC: 6.54 NG/ML (ref 0.1–4)

## 2024-07-03 PROCEDURE — 36415 COLL VENOUS BLD VENIPUNCTURE: CPT | Mod: S$GLB,,, | Performed by: UROLOGY

## 2024-07-03 NOTE — PROGRESS NOTES
PSA & Creatine level collected with 22 gauge needle from left hand using aseptic technique. Patient tolerated well, and is aware of f/u apt.

## 2024-07-08 DIAGNOSIS — E11.9 TYPE 2 DIABETES MELLITUS WITHOUT COMPLICATION, WITHOUT LONG-TERM CURRENT USE OF INSULIN: ICD-10-CM

## 2024-07-09 ENCOUNTER — OFFICE VISIT (OUTPATIENT)
Dept: UROLOGY | Facility: CLINIC | Age: 64
End: 2024-07-09
Payer: COMMERCIAL

## 2024-07-09 DIAGNOSIS — R97.20 ELEVATED PSA: Primary | ICD-10-CM

## 2024-07-09 PROCEDURE — 99214 OFFICE O/P EST MOD 30 MIN: CPT | Mod: S$GLB,,, | Performed by: UROLOGY

## 2024-07-09 PROCEDURE — 3044F HG A1C LEVEL LT 7.0%: CPT | Mod: CPTII,S$GLB,, | Performed by: UROLOGY

## 2024-07-09 PROCEDURE — 3066F NEPHROPATHY DOC TX: CPT | Mod: CPTII,S$GLB,, | Performed by: UROLOGY

## 2024-07-09 PROCEDURE — 3061F NEG MICROALBUMINURIA REV: CPT | Mod: CPTII,S$GLB,, | Performed by: UROLOGY

## 2024-07-09 RX ORDER — METFORMIN HYDROCHLORIDE 500 MG/1
TABLET ORAL
Qty: 90 TABLET | Refills: 1 | Status: SHIPPED | OUTPATIENT
Start: 2024-07-09

## 2024-07-09 NOTE — PROGRESS NOTES
Subjective:       Patient ID: Mumtaz Levy is a 64 y.o. male.    Chief Complaint: No chief complaint on file.      HPI: 64-year-old male with a history of elevated PSA.  No family history of prostate cancer.  He had a benign prostate biopsy on 12/28/2023.  Most recent PSA was 6.54.    07/03/2024     6.54  09/25/2023     6.73  06/21/2023     6.11    12/28/2023  benign prostate biopsy       Past Medical History:   Past Medical History:   Diagnosis Date    Diabetes mellitus, type 2     Elevated PSA     Encounter for blood transfusion     Hyperlipidemia     Hypertension     Peripheral artery disease        Past Surgical Historical:   Past Surgical History:   Procedure Laterality Date    bypass lower extrem Bilateral         Medications:   Medication List with Changes/Refills   Current Medications    AMLODIPINE (NORVASC) 5 MG TABLET    TAKE 1 TABLET(5 MG) BY MOUTH EVERY DAY    ASPIRIN (ECOTRIN) 81 MG EC TABLET    Take 81 mg by mouth once daily.     ATORVASTATIN (LIPITOR) 40 MG TABLET    Take 40 mg by mouth every evening.    CHOLECALCIFEROL, VITAMIN D3, (VITAMIN D3) 125 MCG (5,000 UNIT) TAB    Take 5,000 Units by mouth once daily.    DIAZEPAM (VALIUM) 10 MG TAB    Take 1 tablet (10 mg total) by mouth once. for 1 dose    LANCETS (MICRO THIN LANCETS) 33 GAUGE MISC    1 lancet by Misc.(Non-Drug; Combo Route) route once daily.    LINAGLIPTIN (TRADJENTA) 5 MG TAB TABLET    Take 1 tablet (5 mg total) by mouth once daily.    METFORMIN (GLUCOPHAGE) 500 MG TABLET    TAKE 1/2 TABLET(250 MG) BY MOUTH TWICE DAILY WITH MEALS    SHINGRIX, PF, 50 MCG/0.5 ML INJECTION        TRUE METRIX GLUCOSE METER MISC    USE TO TEST BLOOD SUGAR DAILY AS DIRECTED    TRUE METRIX GLUCOSE TEST STRIP STR    USE TO TEST TWICE DAILY        Past Social History:   Social History     Socioeconomic History    Marital status:    Tobacco Use    Smoking status: Former     Current packs/day: 0.00     Average packs/day: 1 pack/day for 25.0 years (25.0 ttl  pk-yrs)     Types: Cigarettes     Start date: 10/24/1993     Quit date: 10/24/2018     Years since quittin.7     Passive exposure: Past    Smokeless tobacco: Never   Substance and Sexual Activity    Alcohol use: Yes     Comment: rarely    Drug use: Never       Allergies:   Review of patient's allergies indicates:   Allergen Reactions    Bee's [allergen ext-venom-honey bee]     Grass pollen- grass standard Other (See Comments)     Watery eyes and sneezing    Penicillins         Family History:   Family History   Problem Relation Name Age of Onset    Heart disease Mother      Diabetes Father          Review of Systems:  Review of Systems   Constitutional:  Negative for activity change and appetite change.   HENT:  Negative for congestion and dental problem.    Eyes:  Negative for visual disturbance.   Respiratory:  Negative for chest tightness and shortness of breath.    Cardiovascular:  Negative for chest pain.   Gastrointestinal:  Negative for abdominal distention and abdominal pain.   Endocrine: Negative for polyuria.   Genitourinary:  Negative for difficulty urinating, dysuria, flank pain, frequency, hematuria, penile discharge, penile pain, penile swelling, scrotal swelling, testicular pain and urgency.   Musculoskeletal:  Negative for back pain and neck pain.   Skin:  Negative for color change.   Allergic/Immunologic: Positive for immunocompromised state.   Neurological:  Negative for dizziness.   Hematological:  Negative for adenopathy.   Psychiatric/Behavioral:  Negative for agitation, behavioral problems and confusion.        Physical Exam:  Physical Exam  Constitutional:       General: He is not in acute distress.     Appearance: He is well-developed.   HENT:      Head: Normocephalic and atraumatic.      Nose: Nose normal.   Eyes:      General: No scleral icterus.     Conjunctiva/sclera: Conjunctivae normal.      Pupils: Pupils are equal, round, and reactive to light.   Neck:      Thyroid: No  thyromegaly.      Trachea: No tracheal deviation.   Cardiovascular:      Rate and Rhythm: Normal rate and regular rhythm.      Heart sounds: Normal heart sounds.   Pulmonary:      Effort: Pulmonary effort is normal. No respiratory distress.      Breath sounds: Normal breath sounds. No wheezing or rales.   Abdominal:      General: Bowel sounds are normal. There is no distension.      Palpations: Abdomen is soft.      Tenderness: There is no abdominal tenderness. There is no guarding or rebound.   Genitourinary:     Penis: Normal. No tenderness.       Prostate: Normal.   Musculoskeletal:         General: No deformity. Normal range of motion.      Cervical back: Neck supple.   Lymphadenopathy:      Cervical: No cervical adenopathy.   Skin:     General: Skin is warm and dry.      Findings: No erythema or rash.   Neurological:      Mental Status: He is alert and oriented to person, place, and time.      Cranial Nerves: No cranial nerve deficit.   Psychiatric:         Behavior: Behavior normal.         Assessment/Plan:     Elevated PSA:  The patient's PSA has remained stable.  He had a benign prostate biopsy in December of 2023.  We will have him follow up in 6 months with a PSA.  Problem List Items Addressed This Visit    None

## 2024-07-18 ENCOUNTER — OFFICE VISIT (OUTPATIENT)
Dept: FAMILY MEDICINE | Facility: CLINIC | Age: 64
End: 2024-07-18
Payer: COMMERCIAL

## 2024-07-18 VITALS
OXYGEN SATURATION: 98 % | HEART RATE: 74 BPM | RESPIRATION RATE: 15 BRPM | WEIGHT: 171.38 LBS | HEIGHT: 67 IN | DIASTOLIC BLOOD PRESSURE: 70 MMHG | SYSTOLIC BLOOD PRESSURE: 110 MMHG | BODY MASS INDEX: 26.9 KG/M2 | TEMPERATURE: 98 F

## 2024-07-18 DIAGNOSIS — N18.31 TYPE 2 DIABETES MELLITUS WITH STAGE 3A CHRONIC KIDNEY DISEASE, WITHOUT LONG-TERM CURRENT USE OF INSULIN: ICD-10-CM

## 2024-07-18 DIAGNOSIS — E78.2 MIXED HYPERLIPIDEMIA: ICD-10-CM

## 2024-07-18 DIAGNOSIS — I10 ESSENTIAL HYPERTENSION: ICD-10-CM

## 2024-07-18 DIAGNOSIS — N18.31 STAGE 3A CHRONIC KIDNEY DISEASE: Primary | ICD-10-CM

## 2024-07-18 DIAGNOSIS — E11.22 TYPE 2 DIABETES MELLITUS WITH STAGE 3A CHRONIC KIDNEY DISEASE, WITHOUT LONG-TERM CURRENT USE OF INSULIN: ICD-10-CM

## 2024-07-18 PROCEDURE — 1159F MED LIST DOCD IN RCRD: CPT | Mod: CPTII,S$GLB,, | Performed by: INTERNAL MEDICINE

## 2024-07-18 PROCEDURE — 3061F NEG MICROALBUMINURIA REV: CPT | Mod: CPTII,S$GLB,, | Performed by: INTERNAL MEDICINE

## 2024-07-18 PROCEDURE — 99214 OFFICE O/P EST MOD 30 MIN: CPT | Mod: S$GLB,,, | Performed by: INTERNAL MEDICINE

## 2024-07-18 PROCEDURE — 3078F DIAST BP <80 MM HG: CPT | Mod: CPTII,S$GLB,, | Performed by: INTERNAL MEDICINE

## 2024-07-18 PROCEDURE — 1160F RVW MEDS BY RX/DR IN RCRD: CPT | Mod: CPTII,S$GLB,, | Performed by: INTERNAL MEDICINE

## 2024-07-18 PROCEDURE — 3074F SYST BP LT 130 MM HG: CPT | Mod: CPTII,S$GLB,, | Performed by: INTERNAL MEDICINE

## 2024-07-18 PROCEDURE — 3044F HG A1C LEVEL LT 7.0%: CPT | Mod: CPTII,S$GLB,, | Performed by: INTERNAL MEDICINE

## 2024-07-18 PROCEDURE — 3008F BODY MASS INDEX DOCD: CPT | Mod: CPTII,S$GLB,, | Performed by: INTERNAL MEDICINE

## 2024-07-18 PROCEDURE — 3066F NEPHROPATHY DOC TX: CPT | Mod: CPTII,S$GLB,, | Performed by: INTERNAL MEDICINE

## 2024-07-18 RX ORDER — AMLODIPINE BESYLATE 5 MG/1
5 TABLET ORAL DAILY
Qty: 90 TABLET | Refills: 1 | Status: SHIPPED | OUTPATIENT
Start: 2024-07-18

## 2024-07-18 RX ORDER — ATORVASTATIN CALCIUM 40 MG/1
40 TABLET, FILM COATED ORAL NIGHTLY
Qty: 90 TABLET | Refills: 3 | Status: SHIPPED | OUTPATIENT
Start: 2024-07-18

## 2024-07-18 RX ORDER — LINAGLIPTIN 5 MG/1
5 TABLET, FILM COATED ORAL DAILY
Qty: 30 TABLET | Refills: 1 | Status: SHIPPED | OUTPATIENT
Start: 2024-07-18

## 2024-07-18 NOTE — PROGRESS NOTES
"Subjective:       Patient ID: Mumtaz Levy is a 64 y.o. male.    Chief Complaint: Follow-up (Pt. Here for 6mth f/u for DM2.  No c/o today.)    HPI    64 y.o. male presents for follow up of chronic medical problems:     DM2: metformin 250mg bid, linagliptin 5mg daily. Monitors home glucose BID- glucose elevated recently d/t dietary indiscretion.     HTN: amlodipine 5mg    CKD3a: hydration, avoiding nsaids.     HLD: atorvastatin 40mg    Review of Systems   Constitutional:  Negative for diaphoresis and fever.   HENT:  Negative for trouble swallowing.    Respiratory:  Negative for cough and shortness of breath.    Cardiovascular:  Negative for chest pain and leg swelling.   Gastrointestinal:  Negative for abdominal pain and blood in stool.   Musculoskeletal:  Negative for gait problem.   Skin:  Negative for pallor.   Neurological:  Negative for syncope and weakness.       Objective:        Vitals:    07/18/24 1302   BP: 110/70   BP Location: Left arm   Patient Position: Sitting   BP Method: Large (Manual)   Pulse: 74   Resp: 15   Temp: 97.5 °F (36.4 °C)   TempSrc: Temporal   SpO2: 98%   Weight: 77.7 kg (171 lb 6.4 oz)   Height: 5' 7" (1.702 m)       Body mass index is 26.85 kg/m².    Physical Exam  Constitutional:       General: He is not in acute distress.     Appearance: He is well-developed. He is not diaphoretic.   HENT:      Head: Normocephalic and atraumatic.      Right Ear: External ear normal.      Left Ear: External ear normal.   Eyes:      Conjunctiva/sclera: Conjunctivae normal.   Cardiovascular:      Rate and Rhythm: Normal rate and regular rhythm.   Pulmonary:      Effort: Pulmonary effort is normal.      Breath sounds: Normal breath sounds.   Abdominal:      General: Bowel sounds are normal.      Palpations: Abdomen is soft.   Musculoskeletal:      Right lower leg: No edema.      Left lower leg: No edema.   Skin:     General: Skin is warm and dry.      Nails: There is no clubbing.   Neurological:      " General: No focal deficit present.      Mental Status: He is alert.   Psychiatric:         Behavior: Behavior normal.         Judgment: Judgment normal.         Assessment:     1. Stage 3a chronic kidney disease    2. Type 2 diabetes mellitus with stage 3a chronic kidney disease, without long-term current use of insulin    3. Essential hypertension    4. Mixed hyperlipidemia           Plan:         1. Stage 3a chronic kidney disease  - chronic, stable. Monitor w/ labs. Oral hydration. Avoid nsaids.  - Comprehensive Metabolic Panel; Future  - Comprehensive Metabolic Panel    2. Type 2 diabetes mellitus with stage 3a chronic kidney disease, without long-term current use of insulin  - update labs, home glucose elevated. Further med management pending results.  - eye exam due next month.  - Comprehensive Metabolic Panel; Future  - Hemoglobin A1C; Future  - linaGLIPtin (TRADJENTA) 5 mg Tab tablet; Take 1 tablet (5 mg total) by mouth once daily.  Dispense: 30 tablet; Refill: 1  - Hemoglobin A1C  - Comprehensive Metabolic Panel    3. Essential hypertension    - amLODIPine (NORVASC) 5 MG tablet; Take 1 tablet (5 mg total) by mouth once daily.  Dispense: 90 tablet; Refill: 1    4. Mixed hyperlipidemia    - atorvastatin (LIPITOR) 40 MG tablet; Take 1 tablet (40 mg total) by mouth every evening.  Dispense: 90 tablet; Refill: 3      Unless there are intervening problems, Follow up in about 6 months (around 1/18/2025), or if symptoms worsen or fail to improve, for annual, follow up, diabetes, HTN.      Patient note was created using MModal Dictation.  Any errors in syntax or even information may not have been identified and edited on initial review prior to signing this note.

## 2024-07-19 LAB
ALBUMIN SERPL BCP-MCNC: 3.8 G/DL (ref 3.4–5)
ALP SERPL-CCNC: 119 U/L (ref 45–117)
ALT SERPL W P-5'-P-CCNC: 60 U/L (ref 16–61)
ANION GAP SERPL CALC-SCNC: 8 MMOL/L (ref 3–11)
AST SERPL-CCNC: 18 U/L (ref 15–37)
BILIRUB SERPL-MCNC: 0.4 MG/DL (ref 0.2–1)
BUN SERPL-MCNC: 26 MG/DL (ref 7–18)
BUN/CREAT SERPL: 15.38 RATIO
CALCIUM SERPL-MCNC: 9.2 MG/DL (ref 8.5–10.1)
CHLORIDE SERPL-SCNC: 106 MMOL/L (ref 98–107)
CO2 SERPL-SCNC: 24 MMOL/L (ref 21–32)
CREAT SERPL-MCNC: 1.69 MG/DL (ref 0.7–1.3)
ESTIMATED AVG GLUCOSE: 158 MG/DL
GFR ESTIMATION: 41
GLUCOSE SERPL-MCNC: 182 MG/DL (ref 74–106)
HBA1C MFR BLD: 6.6 % (ref 4.2–6.3)
POTASSIUM SERPL-SCNC: 5.2 MMOL/L (ref 3.5–5.1)
PROT SERPL-MCNC: 6.9 G/DL (ref 6.4–8.2)
SODIUM BLD-SCNC: 138 MMOL/L (ref 131–143)

## 2024-07-23 DIAGNOSIS — N18.32 STAGE 3B CHRONIC KIDNEY DISEASE: Primary | ICD-10-CM

## 2024-07-23 NOTE — PROGRESS NOTES
Nephrology referral ordered      Your results have been reviewed.  A1c increased slightly from 6.5 to 6.6, however this is still well controlled so let's continue same medications.  Kidney function did decrease from CKD3a to 3b range so I want you to see a nephrologist- I put in a referral to Dr. Champagne, their office should contact you within the next couple of weeks.  Please call if you have any questions or concerns.    Sincerely,   Dr. Suarez

## 2024-08-19 DIAGNOSIS — E11.22 TYPE 2 DIABETES MELLITUS WITH STAGE 3A CHRONIC KIDNEY DISEASE, WITHOUT LONG-TERM CURRENT USE OF INSULIN: ICD-10-CM

## 2024-08-19 DIAGNOSIS — N18.31 TYPE 2 DIABETES MELLITUS WITH STAGE 3A CHRONIC KIDNEY DISEASE, WITHOUT LONG-TERM CURRENT USE OF INSULIN: ICD-10-CM

## 2024-08-20 RX ORDER — LINAGLIPTIN 5 MG/1
5 TABLET, FILM COATED ORAL
Qty: 90 TABLET | Refills: 1 | Status: SHIPPED | OUTPATIENT
Start: 2024-08-20

## 2024-10-03 DIAGNOSIS — E11.9 TYPE 2 DIABETES MELLITUS WITHOUT COMPLICATION, WITHOUT LONG-TERM CURRENT USE OF INSULIN: ICD-10-CM

## 2024-10-03 RX ORDER — METFORMIN HYDROCHLORIDE 500 MG/1
TABLET ORAL
Qty: 90 TABLET | Refills: 1 | Status: SHIPPED | OUTPATIENT
Start: 2024-10-03

## 2024-11-07 DIAGNOSIS — I10 ESSENTIAL HYPERTENSION: ICD-10-CM

## 2024-11-07 RX ORDER — AMLODIPINE BESYLATE 5 MG/1
5 TABLET ORAL DAILY
Qty: 90 TABLET | Refills: 1 | Status: SHIPPED | OUTPATIENT
Start: 2024-11-07

## 2025-01-06 ENCOUNTER — CLINICAL SUPPORT (OUTPATIENT)
Dept: UROLOGY | Facility: CLINIC | Age: 65
End: 2025-01-06
Payer: COMMERCIAL

## 2025-01-06 DIAGNOSIS — R97.20 ELEVATED PSA: Primary | ICD-10-CM

## 2025-01-06 LAB — PSA, DIAGNOSTIC: 7.93 NG/ML (ref 0.1–4)

## 2025-01-06 NOTE — PROGRESS NOTES
Using aseptic technique, straight stick to Left A/C x 1 attempt with success. 1x1 gauze with bandage applied. Tolerated well.

## 2025-01-09 ENCOUNTER — OFFICE VISIT (OUTPATIENT)
Dept: UROLOGY | Facility: CLINIC | Age: 65
End: 2025-01-09
Payer: COMMERCIAL

## 2025-01-09 VITALS
WEIGHT: 170 LBS | BODY MASS INDEX: 26.68 KG/M2 | SYSTOLIC BLOOD PRESSURE: 143 MMHG | HEART RATE: 68 BPM | DIASTOLIC BLOOD PRESSURE: 74 MMHG | HEIGHT: 67 IN

## 2025-01-09 DIAGNOSIS — R97.20 ELEVATED PSA: Primary | ICD-10-CM

## 2025-01-09 PROCEDURE — 1159F MED LIST DOCD IN RCRD: CPT | Mod: CPTII,,, | Performed by: UROLOGY

## 2025-01-09 PROCEDURE — 99214 OFFICE O/P EST MOD 30 MIN: CPT | Mod: S$PBB,,, | Performed by: UROLOGY

## 2025-01-09 PROCEDURE — 3008F BODY MASS INDEX DOCD: CPT | Mod: CPTII,,, | Performed by: UROLOGY

## 2025-01-09 PROCEDURE — 3077F SYST BP >= 140 MM HG: CPT | Mod: CPTII,,, | Performed by: UROLOGY

## 2025-01-09 PROCEDURE — 3078F DIAST BP <80 MM HG: CPT | Mod: CPTII,,, | Performed by: UROLOGY

## 2025-01-09 NOTE — PROGRESS NOTES
Subjective:       Patient ID: Mumtaz Levy is a 64 y.o. male.    Chief Complaint: No chief complaint on file.      HPI: 64-year-old male with a history of elevated PSA.  No family history of prostate cancer.  He had a benign prostate biopsy on 12/28/2023.  Most recent PSA increased to 7.93.    01/06/2025     7.93  07/03/2024     6.54  09/25/2023     6.73  06/21/2023     6.11    12/28/2023  benign prostate biopsy       Past Medical History:   Past Medical History:   Diagnosis Date    Diabetes mellitus, type 2     Elevated PSA     Encounter for blood transfusion     Hyperlipidemia     Hypertension     Peripheral artery disease        Past Surgical Historical:   Past Surgical History:   Procedure Laterality Date    bypass lower extrem Bilateral         Medications:   Medication List with Changes/Refills   Current Medications    AMLODIPINE (NORVASC) 5 MG TABLET    Take 1 tablet (5 mg total) by mouth once daily.    ASPIRIN (ECOTRIN) 81 MG EC TABLET    Take 81 mg by mouth once daily.     ATORVASTATIN (LIPITOR) 40 MG TABLET    Take 1 tablet (40 mg total) by mouth every evening.    CHOLECALCIFEROL, VITAMIN D3, (VITAMIN D3) 125 MCG (5,000 UNIT) TAB    Take 2,000 Units by mouth once daily.    DIAZEPAM (VALIUM) 10 MG TAB    Take 1 tablet (10 mg total) by mouth once. for 1 dose    LANCETS (MICRO THIN LANCETS) 33 GAUGE MISC    1 lancet by Misc.(Non-Drug; Combo Route) route once daily.    LINAGLIPTIN (TRADJENTA) 5 MG TAB TABLET    TAKE 1 TABLET(5 MG) BY MOUTH DAILY    METFORMIN (GLUCOPHAGE) 500 MG TABLET    TAKE 1/2 TABLET(250 MG) BY MOUTH TWICE DAILY WITH MEALS    SHINGRIX, PF, 50 MCG/0.5 ML INJECTION        TRUE METRIX GLUCOSE METER MISC    USE TO TEST BLOOD SUGAR DAILY AS DIRECTED    TRUE METRIX GLUCOSE TEST STRIP [facility]    USE TO TEST TWICE DAILY    UNABLE TO FIND    Take by mouth every evening. medication name: CBD Gummies  1/2 at bedtime        Past Social History:   Social History     Socioeconomic History    Marital  status:    Tobacco Use    Smoking status: Former     Current packs/day: 0.00     Average packs/day: 1 pack/day for 25.0 years (25.0 ttl pk-yrs)     Types: Cigarettes     Start date: 10/24/1993     Quit date: 10/24/2018     Years since quittin.2     Passive exposure: Past    Smokeless tobacco: Never   Substance and Sexual Activity    Alcohol use: Yes     Comment: rarely    Drug use: Never     Social Drivers of Health     Financial Resource Strain: Low Risk  (7/15/2024)    Overall Financial Resource Strain (CARDIA)     Difficulty of Paying Living Expenses: Not hard at all   Food Insecurity: No Food Insecurity (7/15/2024)    Hunger Vital Sign     Worried About Running Out of Food in the Last Year: Never true     Ran Out of Food in the Last Year: Never true   Physical Activity: Insufficiently Active (7/15/2024)    Exercise Vital Sign     Days of Exercise per Week: 1 day     Minutes of Exercise per Session: 10 min   Stress: Stress Concern Present (7/15/2024)    [employer] - Occupational Stress Questionnaire     Feeling of Stress : To some extent   Housing Stability: Unknown (7/15/2024)    Housing Stability Vital Sign     Unable to Pay for Housing in the Last Year: No       Allergies:   Review of patient's allergies indicates:   Allergen Reactions    Bee's [allergen ext-venom-honey bee]     Grass pollen-flor grass standard Other (See Comments)     Watery eyes and sneezing    Penicillins         Family History:   Family History   Problem Relation Name Age of Onset    Heart disease Mother      Diabetes Father          Review of Systems:  Review of Systems   Constitutional:  Negative for activity change and appetite change.   HENT:  Negative for congestion and dental problem.    Eyes:  Negative for visual disturbance.   Respiratory:  Negative for chest tightness and shortness of breath.    Cardiovascular:  Negative for chest pain.   Gastrointestinal:  Negative for abdominal  distention and abdominal pain.   Endocrine: Negative for polyuria.   Genitourinary:  Negative for difficulty urinating, dysuria, flank pain, frequency, hematuria, penile discharge, penile pain, penile swelling, scrotal swelling, testicular pain and urgency.   Musculoskeletal:  Negative for back pain and neck pain.   Skin:  Negative for color change.   Allergic/Immunologic: Positive for immunocompromised state.   Neurological:  Negative for dizziness.   Hematological:  Negative for adenopathy.   Psychiatric/Behavioral:  Negative for agitation, behavioral problems and confusion.        Physical Exam:  Physical Exam  Constitutional:       General: He is not in acute distress.     Appearance: He is well-developed.   HENT:      Head: Normocephalic and atraumatic.      Nose: Nose normal.   Eyes:      General: No scleral icterus.     Conjunctiva/sclera: Conjunctivae normal.      Pupils: Pupils are equal, round, and reactive to light.   Neck:      Thyroid: No thyromegaly.      Trachea: No tracheal deviation.   Cardiovascular:      Rate and Rhythm: Normal rate and regular rhythm.      Heart sounds: Normal heart sounds.   Pulmonary:      Effort: Pulmonary effort is normal. No respiratory distress.      Breath sounds: Normal breath sounds. No wheezing or rales.   Abdominal:      General: Bowel sounds are normal. There is no distension.      Palpations: Abdomen is soft.      Tenderness: There is no abdominal tenderness. There is no guarding or rebound.   Genitourinary:     Penis: Normal. No tenderness.       Prostate: Normal.   Musculoskeletal:         General: No deformity. Normal range of motion.      Cervical back: Neck supple.   Lymphadenopathy:      Cervical: No cervical adenopathy.   Skin:     General: Skin is warm and dry.      Findings: No erythema or rash.   Neurological:      Mental Status: He is alert and oriented to person, place, and time.      Cranial Nerves: No cranial nerve deficit.   Psychiatric:          Behavior: Behavior normal.         Assessment/Plan:     Elevated PSA:  The patient's PSA has increased to 7.93.  He had a benign prostate biopsy in December of 2023.  We will place an order for prostate MRI for further evaluation.  Discussed the possibility of targeted biopsy versus rechecking PSA in 6 months.  We will notify the patient of results when they are received.    I, Dr. Jorge Maguire have seen and personally evaluated the patient. I have formulated the plan reviewed all pertinent imaging and clinical data.  I agree with the nurse practitioner's assessment, and I have personally formulated the plan for this patient's care as described by the midlevel.  Problem List Items Addressed This Visit    None  Visit Diagnoses       Elevated PSA    -  Primary

## 2025-01-13 DIAGNOSIS — E11.9 TYPE 2 DIABETES MELLITUS WITHOUT COMPLICATION, WITHOUT LONG-TERM CURRENT USE OF INSULIN: ICD-10-CM

## 2025-01-29 DIAGNOSIS — E11.9 TYPE 2 DIABETES MELLITUS WITHOUT COMPLICATION: ICD-10-CM

## 2025-02-05 DIAGNOSIS — E11.9 TYPE 2 DIABETES MELLITUS WITHOUT COMPLICATION: ICD-10-CM

## 2025-02-15 DIAGNOSIS — E11.9 TYPE 2 DIABETES MELLITUS WITHOUT COMPLICATION, WITHOUT LONG-TERM CURRENT USE OF INSULIN: ICD-10-CM

## 2025-02-18 RX ORDER — METFORMIN HYDROCHLORIDE 500 MG/1
TABLET ORAL
Qty: 90 TABLET | Refills: 1 | Status: SHIPPED | OUTPATIENT
Start: 2025-02-18

## 2025-02-19 DIAGNOSIS — E11.9 TYPE 2 DIABETES MELLITUS WITHOUT COMPLICATION: ICD-10-CM

## 2025-02-26 ENCOUNTER — OFFICE VISIT (OUTPATIENT)
Facility: CLINIC | Age: 65
End: 2025-02-26
Payer: COMMERCIAL

## 2025-02-26 VITALS
OXYGEN SATURATION: 99 % | RESPIRATION RATE: 16 BRPM | BODY MASS INDEX: 27.31 KG/M2 | HEIGHT: 67 IN | SYSTOLIC BLOOD PRESSURE: 130 MMHG | HEART RATE: 75 BPM | DIASTOLIC BLOOD PRESSURE: 70 MMHG | WEIGHT: 174 LBS | TEMPERATURE: 98 F

## 2025-02-26 DIAGNOSIS — I10 ESSENTIAL HYPERTENSION: ICD-10-CM

## 2025-02-26 DIAGNOSIS — E11.22 TYPE 2 DIABETES MELLITUS WITH STAGE 3A CHRONIC KIDNEY DISEASE, WITHOUT LONG-TERM CURRENT USE OF INSULIN: ICD-10-CM

## 2025-02-26 DIAGNOSIS — E78.2 MIXED HYPERLIPIDEMIA: ICD-10-CM

## 2025-02-26 DIAGNOSIS — N18.31 TYPE 2 DIABETES MELLITUS WITH STAGE 3A CHRONIC KIDNEY DISEASE, WITHOUT LONG-TERM CURRENT USE OF INSULIN: ICD-10-CM

## 2025-02-26 DIAGNOSIS — Z00.00 ANNUAL PHYSICAL EXAM: Primary | ICD-10-CM

## 2025-02-26 DIAGNOSIS — N18.31 STAGE 3A CHRONIC KIDNEY DISEASE: ICD-10-CM

## 2025-02-26 PROCEDURE — 3075F SYST BP GE 130 - 139MM HG: CPT | Mod: CPTII,S$GLB,, | Performed by: INTERNAL MEDICINE

## 2025-02-26 PROCEDURE — 3078F DIAST BP <80 MM HG: CPT | Mod: CPTII,S$GLB,, | Performed by: INTERNAL MEDICINE

## 2025-02-26 PROCEDURE — 1160F RVW MEDS BY RX/DR IN RCRD: CPT | Mod: CPTII,S$GLB,, | Performed by: INTERNAL MEDICINE

## 2025-02-26 PROCEDURE — 3008F BODY MASS INDEX DOCD: CPT | Mod: CPTII,S$GLB,, | Performed by: INTERNAL MEDICINE

## 2025-02-26 PROCEDURE — 1159F MED LIST DOCD IN RCRD: CPT | Mod: CPTII,S$GLB,, | Performed by: INTERNAL MEDICINE

## 2025-02-26 PROCEDURE — 99396 PREV VISIT EST AGE 40-64: CPT | Mod: S$GLB,,, | Performed by: INTERNAL MEDICINE

## 2025-02-26 RX ORDER — LINAGLIPTIN 5 MG/1
5 TABLET, FILM COATED ORAL DAILY
Qty: 90 TABLET | Refills: 1 | Status: SHIPPED | OUTPATIENT
Start: 2025-02-26

## 2025-02-26 RX ORDER — LINAGLIPTIN 5 MG/1
5 TABLET, FILM COATED ORAL
Qty: 90 TABLET | Refills: 1 | OUTPATIENT
Start: 2025-02-26

## 2025-02-26 NOTE — PROGRESS NOTES
Subjective:       Patient ID: Mumtaz Levy is a 64 y.o. male.    Chief Complaint: Annual Exam (6 month )    HPI      64 y.o. male here for healthcare maintenance and f/u chronic medical conditions.        Health Maintenance Topics with due status: Not Due       Topic Last Completion Date    TETANUS VACCINE 07/15/2023    Colorectal Cancer Screening 08/02/2023    Low Dose Statin 02/26/2025       Health Maintenance Due   Topic Date Due    Hepatitis C Screening  Never done    HIV Screening  Never done    Pneumococcal Vaccines (Age 50+) (1 of 2 - PCV) Never done    LDCT Lung Screen  Never done    RSV Vaccine (Age 60+ and Pregnant patients) (1 - Risk 60-74 years 1-dose series) Never done    Diabetic Eye Exam  08/17/2024    Influenza Vaccine (1) Never done    COVID-19 Vaccine (3 - 2024-25 season) 09/01/2024    Foot Exam  01/18/2025    Diabetes Urine Screening  01/19/2025    Lipid Panel  01/19/2025    Hemoglobin A1c  01/19/2025       Health Maintenance Due   Topic Date Due    Hepatitis C Screening  Never done    HIV Screening  Never done    Pneumococcal Vaccines (Age 50+) (1 of 2 - PCV) Never done    LDCT Lung Screen  Never done    RSV Vaccine (Age 60+ and Pregnant patients) (1 - Risk 60-74 years 1-dose series) Never done    Diabetic Eye Exam  08/17/2024    Influenza Vaccine (1) Never done    COVID-19 Vaccine (3 - 2024-25 season) 09/01/2024    Foot Exam  01/18/2025    Diabetes Urine Screening  01/19/2025    Lipid Panel  01/19/2025    Hemoglobin A1c  01/19/2025           Medical Problems:      DM2: metformin 250mg bid, linagliptin 5mg daily. He states he will be on medicare in 2 days and that tradjenta will be too expensive, he is requesting names of alternatives to run by his . Due for eye exam.      HTN: amlodipine 5mg     CKD3a: hydration, avoiding nsaids.      HLD: atorvastatin 40mg      Past Medical History:   Diagnosis Date    Diabetes mellitus, type 2     Elevated PSA     Encounter for blood  "transfusion     Hyperlipidemia     Hypertension     Peripheral artery disease      Past Surgical History:   Procedure Laterality Date    bypass lower extrem Bilateral      Family History   Problem Relation Name Age of Onset    Heart disease Mother      Diabetes Father       Social History[1]  Review of patient's allergies indicates:   Allergen Reactions    Bee's [allergen ext-venom-honey bee]     Grass pollen-june grass standard Other (See Comments)     Watery eyes and sneezing    Penicillins      Current Medications[2]        Review of Systems   Constitutional:  Negative for diaphoresis and fever.   HENT:  Negative for trouble swallowing.    Respiratory:  Negative for cough and shortness of breath.    Cardiovascular:  Negative for chest pain and leg swelling.   Gastrointestinal:  Negative for abdominal pain and blood in stool.   Genitourinary:  Negative for decreased urine volume.   Musculoskeletal:  Negative for gait problem.   Skin:  Negative for pallor.   Neurological:  Negative for syncope and weakness.          Objective:        Vitals:    02/26/25 1506   BP: 130/70   BP Location: Left arm   Patient Position: Sitting   Pulse: 75   Resp: 16   Temp: 97.8 °F (36.6 °C)   SpO2: 99%   Weight: 78.9 kg (174 lb)   Height: 5' 7" (1.702 m)       Body mass index is 27.25 kg/m².    Physical Exam  Constitutional:       General: He is not in acute distress.     Appearance: He is well-developed. He is not diaphoretic.   HENT:      Head: Normocephalic and atraumatic.      Right Ear: External ear normal.      Left Ear: External ear normal.   Eyes:      Conjunctiva/sclera: Conjunctivae normal.   Cardiovascular:      Rate and Rhythm: Normal rate and regular rhythm.   Pulmonary:      Effort: Pulmonary effort is normal.      Breath sounds: Normal breath sounds.   Abdominal:      Palpations: Abdomen is soft.   Musculoskeletal:      Right lower leg: No edema.      Left lower leg: No edema.   Skin:     General: Skin is warm and dry. "      Nails: There is no clubbing.   Neurological:      General: No focal deficit present.      Mental Status: He is alert. Mental status is at baseline.   Psychiatric:         Behavior: Behavior normal.         Judgment: Judgment normal.         Assessment:     1. Annual physical exam    2. Type 2 diabetes mellitus with stage 3a chronic kidney disease, without long-term current use of insulin    3. Stage 3a chronic kidney disease    4. Essential hypertension    5. Mixed hyperlipidemia           Plan:         1. Annual physical exam  - labs - non-fasting today  - immunizations reviewed, discussed  - CRC screen utd  - CBC Auto Differential  - Comprehensive Metabolic Panel  - Hemoglobin A1C  - Lipid Panel  - TSH  - T4, Free  - Microalbumin/Creatinine Ratio, Urine    2. Type 2 diabetes mellitus with stage 3a chronic kidney disease, without long-term current use of insulin  - controlled. Update labs today. For now, refill tradjenta on current insurance plan- discussed med alternatives in same class and provided list for him to check with . Add jardiance for CKD. Depending on renal function, may need to discontinue metformin- pt informed. Due for eye exam, but will defer referral at this time given upcoming insurance change.   - linaGLIPtin (TRADJENTA) 5 mg Tab tablet; Take 1 tablet (5 mg total) by mouth once daily.  Dispense: 90 tablet; Refill: 1  - Hemoglobin A1C  - Microalbumin/Creatinine Ratio, Urine  - empagliflozin (JARDIANCE) 25 mg tablet; Take 1 tablet (25 mg total) by mouth once daily.  Dispense: 90 tablet; Refill: 1    3. Stage 3a chronic kidney disease  - chronic, worsened to ckd3b on most recent labs x 1. Monitor w/ labs. Oral hydration. Avoid nsaids.  - Comprehensive Metabolic Panel  - Microalbumin/Creatinine Ratio, Urine    4. Essential hypertension  - controlled, amlodipine    5. Mixed hyperlipidemia  - controlled, atorva 40mg  - Lipid Panel              Unless there are intervening problems,  Follow up in about 6 months (around 2025) for follow up, diabetes, HTN.      Patient note was created using MModal Dictation.  Any errors in syntax or even information may not have been identified and edited on initial review prior to signing this note.         [1]   Social History  Socioeconomic History    Marital status:    Tobacco Use    Smoking status: Former     Current packs/day: 0.00     Average packs/day: 1 pack/day for 25.0 years (25.0 ttl pk-yrs)     Types: Cigarettes     Start date: 10/24/1993     Quit date: 10/24/2018     Years since quittin.3     Passive exposure: Past    Smokeless tobacco: Never   Substance and Sexual Activity    Alcohol use: Yes     Comment: rarely    Drug use: Never     Social Drivers of Health     Financial Resource Strain: Low Risk  (2025)    Overall Financial Resource Strain (CARDIA)     Difficulty of Paying Living Expenses: Not hard at all   Food Insecurity: No Food Insecurity (2025)    Hunger Vital Sign     Worried About Running Out of Food in the Last Year: Never true     Ran Out of Food in the Last Year: Never true   Transportation Needs: No Transportation Needs (2025)    PRAPARE - Transportation     Lack of Transportation (Medical): No     Lack of Transportation (Non-Medical): No   Physical Activity: Inactive (2025)    Exercise Vital Sign     Days of Exercise per Week: 0 days     Minutes of Exercise per Session: 0 min   Stress: No Stress Concern Present (2025)    [employer] - Occupational Stress Questionnaire     Feeling of Stress : Not at all   Housing Stability: Low Risk  (2025)    Housing Stability Vital Sign     Unable to Pay for Housing in the Last Year: No     Number of Times Moved in the Last Year: 0     Homeless in the Last Year: No   [2]   Current Outpatient Medications:     amLODIPine (NORVASC) 5 MG tablet, Take 1 tablet (5 mg total) by mouth once daily., Disp: 90 tablet, Rfl: 1    aspirin  (ECOTRIN) 81 MG EC tablet, Take 81 mg by mouth once daily. , Disp: , Rfl:     atorvastatin (LIPITOR) 40 MG tablet, Take 1 tablet (40 mg total) by mouth every evening., Disp: 90 tablet, Rfl: 3    blood sugar diagnostic (TRUE METRIX GLUCOSE TEST STRIP) Strp, USE TO TEST TWICE DAILY, Disp: 300 strip, Rfl: 0    cholecalciferol, vitamin D3, (VITAMIN D3) 125 mcg (5,000 unit) Tab, Take 2,000 Units by mouth once daily., Disp: , Rfl:     lancets (MICRO THIN LANCETS) 33 gauge Misc, 1 lancet by Misc.(Non-Drug; Combo Route) route once daily., Disp: 300 each, Rfl: 3    metFORMIN (GLUCOPHAGE) 500 MG tablet, TAKE 1/2 TABLET(250 MG) BY MOUTH TWICE DAILY WITH MEALS, Disp: 90 tablet, Rfl: 1    TRUE METRIX GLUCOSE METER Misc, USE TO TEST BLOOD SUGAR DAILY AS DIRECTED, Disp: , Rfl: 0    empagliflozin (JARDIANCE) 25 mg tablet, Take 1 tablet (25 mg total) by mouth once daily., Disp: 90 tablet, Rfl: 1    linaGLIPtin (TRADJENTA) 5 mg Tab tablet, Take 1 tablet (5 mg total) by mouth once daily., Disp: 90 tablet, Rfl: 1    SHINGRIX, PF, 50 mcg/0.5 mL injection, , Disp: , Rfl:

## 2025-02-27 LAB
ABS NRBC COUNT: 0 THOU/UL (ref 0–0.01)
ABSOLUTE BASOPHIL: 0 10*3/UL (ref 0–0.3)
ABSOLUTE EOSINOPHIL: 0.1 10*3/UL (ref 0–0.6)
ABSOLUTE IMMATURE GRAN: 0.05 THOU/UL (ref 0–0.03)
ABSOLUTE LYMPHOCYTE: 2.5 10*3/UL (ref 1.2–4)
ABSOLUTE MONOCYTE: 0.7 10*3/UL (ref 0.1–0.8)
ALBUMIN SERPL BCP-MCNC: 3.8 G/DL (ref 3.4–5)
ALBUMIN/GLOBULIN RATIO: 0.9 RATIO (ref 1.1–1.8)
ALP SERPL-CCNC: 103 U/L (ref 45–117)
ALT SERPL W P-5'-P-CCNC: 78 U/L (ref 16–61)
ANION GAP SERPL CALC-SCNC: 8 MMOL/L (ref 3–11)
AST SERPL-CCNC: 28 U/L (ref 15–37)
BASOPHILS NFR BLD: 0.5 % (ref 0–3)
BILIRUB SERPL-MCNC: 0.6 MG/DL (ref 0.2–1)
BUN SERPL-MCNC: 25 MG/DL (ref 7–18)
BUN/CREAT SERPL: 16.66 RATIO
CALCIUM SERPL-MCNC: 9.6 MG/DL (ref 8.5–10.1)
CHLORIDE SERPL-SCNC: 103 MMOL/L (ref 98–107)
CHOLEST SERPL-MSCNC: 125 MG/DL
CO2 SERPL-SCNC: 27 MMOL/L (ref 21–32)
CREAT SERPL-MCNC: 1.5 MG/DL (ref 0.7–1.3)
CREATININE, URINE: 149.3 MG/DL (ref 10–175)
EOSINOPHIL NFR BLD: 1 % (ref 0–6)
ERYTHROCYTE [DISTWIDTH] IN BLOOD BY AUTOMATED COUNT: 12.5 % (ref 0–15.5)
ESTIMATED AVG GLUCOSE: 204 MG/DL
GFR ESTIMATION: 52
GLOBULIN: 4.1 G/DL (ref 2.3–3.5)
GLUCOSE SERPL-MCNC: 127 MG/DL (ref 74–106)
HBA1C MFR BLD: 7.9 % (ref 4.2–6.3)
HCT VFR BLD AUTO: 44.3 % (ref 42–52)
HDL/CHOLESTEROL RATIO: 3.7 RATIO
HDLC SERPL-MCNC: 34 MG/DL
HGB BLD-MCNC: 15.1 G/DL (ref 14–18)
IMMATURE GRANULOCYTES: 0.7 % (ref 0–0.43)
LDLC SERPL CALC-MCNC: 51.6 MG/DL
LYMPHOCYTES NFR BLD: 32.9 % (ref 20–45)
MCH RBC QN AUTO: 29.5 PG (ref 27–32)
MCHC RBC AUTO-ENTMCNC: 34.1 % (ref 32–36)
MCV RBC AUTO: 86.7 FL (ref 80–97)
MICROALBUMIN URINE: 44.7 MG/L (ref 0–20)
MICROALBUMIN/CREATININE RATIO: 29 MG/G (ref 0–30)
MONOCYTES NFR BLD: 8.9 % (ref 2–10)
NEUTROPHILS # BLD AUTO: 4.3 10*3/UL (ref 1.4–7)
NEUTROPHILS NFR BLD: 56 % (ref 50–80)
NUCLEATED RED BLOOD CELLS: 0 % (ref 0–0.2)
PLATELETS: 230 10*3/UL (ref 130–400)
PMV BLD AUTO: 10.8 FL (ref 9.2–12.2)
POTASSIUM SERPL-SCNC: 4.3 MMOL/L (ref 3.5–5.1)
PROT SERPL-MCNC: 7.9 G/DL (ref 6.4–8.2)
RBC # BLD AUTO: 5.11 10*6/UL (ref 4.7–6.1)
SODIUM BLD-SCNC: 138 MMOL/L (ref 131–143)
T4 FREE SP9 P CHAL SERPL-SCNC: 1.23 NG/DL (ref 0.76–1.46)
TRIGL SERPL-MCNC: 197 MG/DL (ref 0–149)
TSH SERPL DL<=0.005 MIU/L-ACNC: 3.08 UIU/ML (ref 0.36–3.74)
VLDL CHOLESTEROL: 39 MG/DL
WBC # BLD: 7.6 10*3/UL (ref 4.5–10)

## 2025-03-05 ENCOUNTER — RESULTS FOLLOW-UP (OUTPATIENT)
Facility: CLINIC | Age: 65
End: 2025-03-05

## 2025-03-05 NOTE — PROGRESS NOTES
Results released to patient portal.      Your results have been reviewed.  The A1c has increased from 6.6 to 7.9 which is above goal/uncontrolled. So adding the jardiance should help this. Kidney function will allow us to continue the metformin. So the medication up in the air is the tradjenta since it won't be covered under insurance any longer- were you able to find out covered alternatives that we discussed in clinic?  Other labs look pretty good... cholesterol at goal. Kidney function stable to slightly better than normal range. Blood count and thyroid labs normal.   Please call if you have any questions or concerns.    Sincerely,   Dr. Suarez

## 2025-04-28 ENCOUNTER — PATIENT MESSAGE (OUTPATIENT)
Facility: CLINIC | Age: 65
End: 2025-04-28
Payer: COMMERCIAL

## 2025-04-28 DIAGNOSIS — E11.9 TYPE 2 DIABETES MELLITUS WITHOUT COMPLICATION, WITHOUT LONG-TERM CURRENT USE OF INSULIN: ICD-10-CM

## 2025-05-07 DIAGNOSIS — I10 ESSENTIAL HYPERTENSION: ICD-10-CM

## 2025-05-08 RX ORDER — AMLODIPINE BESYLATE 5 MG/1
5 TABLET ORAL
Qty: 90 TABLET | Refills: 1 | Status: SHIPPED | OUTPATIENT
Start: 2025-05-08

## 2025-06-20 DIAGNOSIS — R97.20 ELEVATED PSA: Primary | ICD-10-CM

## 2025-07-10 ENCOUNTER — OFFICE VISIT (OUTPATIENT)
Dept: UROLOGY | Facility: CLINIC | Age: 65
End: 2025-07-10
Payer: MEDICARE

## 2025-07-10 VITALS
HEIGHT: 67 IN | DIASTOLIC BLOOD PRESSURE: 72 MMHG | WEIGHT: 165 LBS | HEART RATE: 71 BPM | SYSTOLIC BLOOD PRESSURE: 135 MMHG | BODY MASS INDEX: 25.9 KG/M2

## 2025-07-10 DIAGNOSIS — R97.20 ELEVATED PSA: Primary | ICD-10-CM

## 2025-07-10 PROCEDURE — 99214 OFFICE O/P EST MOD 30 MIN: CPT | Mod: S$PBB,,, | Performed by: UROLOGY

## 2025-07-10 RX ORDER — CEFDINIR 300 MG/1
300 CAPSULE ORAL 2 TIMES DAILY
Qty: 28 CAPSULE | Refills: 0 | Status: SHIPPED | OUTPATIENT
Start: 2025-07-10 | End: 2025-07-24

## 2025-07-10 NOTE — PROGRESS NOTES
Subjective:       Patient ID: Mumtaz Levy is a 65 y.o. male.    Chief Complaint: Elevated PSA      HPI: 65-year-old male with a history of elevated PSA.  No family history of prostate cancer.  He had a benign prostate biopsy on 12/28/2023.  He had prostate MRI (PI-RADS 2/ 84 cc) on 01/28/2025.  Most recent PSA increased to 8.45.    07/07/1015    8.45  01/06/2025    7.93  07/03/2024    6.54  09/25/2023    6.73  06/21/2023    6.11    12/28/2023  benign prostate biopsy       Past Medical History:   Past Medical History:   Diagnosis Date    Diabetes mellitus, type 2     Elevated PSA     Encounter for blood transfusion     Hyperlipidemia     Hypertension     Peripheral artery disease        Past Surgical Historical:   Past Surgical History:   Procedure Laterality Date    bypass lower extrem Bilateral         Medications:   Medication List with Changes/Refills   New Medications    CEFDINIR (OMNICEF) 300 MG CAPSULE    Take 1 capsule (300 mg total) by mouth 2 (two) times daily. for 14 days   Current Medications    AMLODIPINE (NORVASC) 5 MG TABLET    TAKE 1 TABLET(5 MG) BY MOUTH DAILY    ASPIRIN (ECOTRIN) 81 MG EC TABLET    Take 81 mg by mouth once daily.     ATORVASTATIN (LIPITOR) 40 MG TABLET    Take 1 tablet (40 mg total) by mouth every evening.    BLOOD SUGAR DIAGNOSTIC (TRUE METRIX GLUCOSE TEST STRIP) STRP    USE TO TEST TWICE DAILY    CHOLECALCIFEROL, VITAMIN D3, (VITAMIN D3) 125 MCG (5,000 UNIT) TAB    Take 2,000 Units by mouth once daily.    EMPAGLIFLOZIN (JARDIANCE) 25 MG TABLET    Take 1 tablet (25 mg total) by mouth once daily.    LANCETS (MICRO THIN LANCETS) 33 GAUGE MISC    1 lancet by Misc.(Non-Drug; Combo Route) route once daily.    LINAGLIPTIN (TRADJENTA) 5 MG TAB TABLET    Take 1 tablet (5 mg total) by mouth once daily.    METFORMIN (GLUCOPHAGE) 500 MG TABLET    TAKE 1/2 TABLET(250 MG) BY MOUTH TWICE DAILY WITH MEALS    SHINGRIX, PF, 50 MCG/0.5 ML INJECTION        TRUE METRIX GLUCOSE METER MISC    USE TO  TEST BLOOD SUGAR DAILY AS DIRECTED        Past Social History:   Social History     Socioeconomic History    Marital status:    Tobacco Use    Smoking status: Former     Current packs/day: 0.00     Average packs/day: 1 pack/day for 25.0 years (25.0 ttl pk-yrs)     Types: Cigarettes     Start date: 10/24/1993     Quit date: 10/24/2018     Years since quittin.7     Passive exposure: Past    Smokeless tobacco: Never   Substance and Sexual Activity    Alcohol use: Yes     Comment: rarely    Drug use: Never     Social Drivers of Health     Financial Resource Strain: Low Risk  (2025)    Overall Financial Resource Strain (CARDIA)     Difficulty of Paying Living Expenses: Not hard at all   Food Insecurity: No Food Insecurity (2025)    Hunger Vital Sign     Worried About Running Out of Food in the Last Year: Never true     Ran Out of Food in the Last Year: Never true   Transportation Needs: No Transportation Needs (2025)    PRAPARE - Transportation     Lack of Transportation (Medical): No     Lack of Transportation (Non-Medical): No   Physical Activity: Inactive (2025)    Exercise Vital Sign     Days of Exercise per Week: 0 days     Minutes of Exercise per Session: 0 min   Stress: No Stress Concern Present (2025)    [employer] - Occupational Stress Questionnaire     Feeling of Stress : Not at all   Housing Stability: Low Risk  (2025)    Housing Stability Vital Sign     Unable to Pay for Housing in the Last Year: No     Number of Times Moved in the Last Year: 0     Homeless in the Last Year: No       Allergies:   Review of patient's allergies indicates:   Allergen Reactions    Bee's [allergen ext-venom-honey bee]     Grass pollen-flor grass standard Other (See Comments)     Watery eyes and sneezing    Penicillins         Family History:   Family History   Problem Relation Name Age of Onset    Heart disease Mother      Diabetes Father          Review of  Systems:  Review of Systems   Constitutional:  Negative for activity change and appetite change.   HENT:  Negative for congestion and dental problem.    Eyes:  Negative for visual disturbance.   Respiratory:  Negative for chest tightness and shortness of breath.    Cardiovascular:  Negative for chest pain.   Gastrointestinal:  Negative for abdominal distention and abdominal pain.   Endocrine: Negative for polyuria.   Genitourinary:  Negative for difficulty urinating, dysuria, flank pain, frequency, hematuria, penile discharge, penile pain, penile swelling, scrotal swelling, testicular pain and urgency.   Musculoskeletal:  Negative for back pain and neck pain.   Skin:  Negative for color change.   Allergic/Immunologic: Positive for immunocompromised state.   Neurological:  Negative for dizziness.   Hematological:  Negative for adenopathy.   Psychiatric/Behavioral:  Negative for agitation, behavioral problems and confusion.        Physical Exam:  Physical Exam  Constitutional:       General: He is not in acute distress.     Appearance: He is well-developed.   HENT:      Head: Normocephalic and atraumatic.      Nose: Nose normal.   Eyes:      General: No scleral icterus.     Conjunctiva/sclera: Conjunctivae normal.      Pupils: Pupils are equal, round, and reactive to light.   Neck:      Thyroid: No thyromegaly.      Trachea: No tracheal deviation.   Cardiovascular:      Rate and Rhythm: Normal rate and regular rhythm.      Heart sounds: Normal heart sounds.   Pulmonary:      Effort: Pulmonary effort is normal. No respiratory distress.      Breath sounds: Normal breath sounds. No wheezing or rales.   Abdominal:      General: Bowel sounds are normal. There is no distension.      Palpations: Abdomen is soft.      Tenderness: There is no abdominal tenderness. There is no guarding or rebound.   Genitourinary:     Penis: Normal. No tenderness.       Prostate: Normal.   Musculoskeletal:         General: No deformity. Normal  range of motion.      Cervical back: Neck supple.   Lymphadenopathy:      Cervical: No cervical adenopathy.   Skin:     General: Skin is warm and dry.      Findings: No erythema or rash.   Neurological:      Mental Status: He is alert and oriented to person, place, and time.      Cranial Nerves: No cranial nerve deficit.   Psychiatric:         Behavior: Behavior normal.         Assessment/Plan:     Elevated PSA:  The patient's PSA has increased to 8.45.  He had a benign prostate biopsy in December of 2023.  Prostate MRI completed in January.  We will treat the patient with 2 weeks of Omnicef and have him follow up in 6 weeks    I, Dr. Jorge Maguire have seen and personally evaluated the patient. I have formulated the plan reviewed all pertinent imaging and clinical data.  I agree with the nurse practitioner's assessment, and I have personally formulated the plan for this patient's care as described by the midlevel.  Problem List Items Addressed This Visit    None  Visit Diagnoses         Elevated PSA    -  Primary    Relevant Medications    cefdinir (OMNICEF) 300 MG capsule

## 2025-08-11 DIAGNOSIS — E11.9 TYPE 2 DIABETES MELLITUS WITHOUT COMPLICATION, WITHOUT LONG-TERM CURRENT USE OF INSULIN: ICD-10-CM

## 2025-08-11 DIAGNOSIS — E78.2 MIXED HYPERLIPIDEMIA: ICD-10-CM

## 2025-08-12 RX ORDER — METFORMIN HYDROCHLORIDE 500 MG/1
TABLET ORAL
Qty: 90 TABLET | Refills: 0 | Status: SHIPPED | OUTPATIENT
Start: 2025-08-12

## 2025-08-14 RX ORDER — ATORVASTATIN CALCIUM 40 MG/1
40 TABLET, FILM COATED ORAL NIGHTLY
Qty: 90 TABLET | Refills: 0 | Status: SHIPPED | OUTPATIENT
Start: 2025-08-14

## 2025-08-21 ENCOUNTER — TELEPHONE (OUTPATIENT)
Dept: UROLOGY | Facility: CLINIC | Age: 65
End: 2025-08-21
Payer: MEDICARE

## 2025-08-21 ENCOUNTER — RESULTS FOLLOW-UP (OUTPATIENT)
Dept: UROLOGY | Facility: CLINIC | Age: 65
End: 2025-08-21
Payer: MEDICARE

## 2025-08-21 ENCOUNTER — CLINICAL SUPPORT (OUTPATIENT)
Dept: FAMILY MEDICINE | Facility: CLINIC | Age: 65
End: 2025-08-21
Payer: MEDICARE

## 2025-08-21 DIAGNOSIS — Z01.89 ROUTINE LAB DRAW: Primary | ICD-10-CM

## 2025-08-21 DIAGNOSIS — R97.20 ELEVATED PSA: Primary | ICD-10-CM

## 2025-08-21 DIAGNOSIS — R97.20 ELEVATED PSA: ICD-10-CM

## 2025-08-21 LAB — PSA, DIAGNOSTIC: 11.1 NG/ML (ref 0–4)

## 2025-08-23 ENCOUNTER — PATIENT MESSAGE (OUTPATIENT)
Dept: RESEARCH | Facility: HOSPITAL | Age: 65
End: 2025-08-23
Payer: MEDICARE

## 2025-08-25 ENCOUNTER — TELEPHONE (OUTPATIENT)
Facility: CLINIC | Age: 65
End: 2025-08-25
Payer: MEDICARE

## 2025-08-26 ENCOUNTER — OFFICE VISIT (OUTPATIENT)
Facility: CLINIC | Age: 65
End: 2025-08-26
Payer: MEDICARE

## 2025-08-26 ENCOUNTER — TELEPHONE (OUTPATIENT)
Dept: UROLOGY | Facility: CLINIC | Age: 65
End: 2025-08-26
Payer: MEDICARE

## 2025-08-26 VITALS
BODY MASS INDEX: 26.21 KG/M2 | HEART RATE: 76 BPM | WEIGHT: 167 LBS | OXYGEN SATURATION: 96 % | RESPIRATION RATE: 16 BRPM | HEIGHT: 67 IN | DIASTOLIC BLOOD PRESSURE: 80 MMHG | SYSTOLIC BLOOD PRESSURE: 130 MMHG | TEMPERATURE: 97 F

## 2025-08-26 DIAGNOSIS — I10 ESSENTIAL HYPERTENSION: ICD-10-CM

## 2025-08-26 DIAGNOSIS — E11.22 TYPE 2 DIABETES MELLITUS WITH STAGE 3B CHRONIC KIDNEY DISEASE, WITHOUT LONG-TERM CURRENT USE OF INSULIN: Primary | ICD-10-CM

## 2025-08-26 DIAGNOSIS — N18.32 TYPE 2 DIABETES MELLITUS WITH STAGE 3B CHRONIC KIDNEY DISEASE, WITHOUT LONG-TERM CURRENT USE OF INSULIN: Primary | ICD-10-CM

## 2025-08-26 DIAGNOSIS — N18.32 STAGE 3B CHRONIC KIDNEY DISEASE: ICD-10-CM

## 2025-08-26 LAB
ALBUMIN SERPL-MCNC: 4.4 G/DL (ref 3.5–5.2)
ALBUMIN/GLOB SERPL ELPH: 1.8 {RATIO} (ref 1–2.7)
ALP ISOS SERPL LEV INH-CCNC: 105 U/L (ref 40–130)
ALT (SGPT): 41 U/L (ref 0–41)
ANION GAP SERPL CALC-SCNC: 14 MMOL/L (ref 8–17)
AST SERPL-CCNC: 21 U/L (ref 0–40)
BILIRUBIN, TOTAL: 0.4 MG/DL (ref 0–1.2)
BUN/CREAT SERPL: 15.7 (ref 6–20)
CALCIUM SERPL-MCNC: 9.7 MG/DL (ref 8.6–10.2)
CARBON DIOXIDE, CO2: 23 MMOL/L (ref 22–29)
CHLORIDE: 105 MMOL/L (ref 98–107)
CREAT SERPL-MCNC: 1.41 MG/DL (ref 0.7–1.2)
CREAT UR-MCNC: 67.1 MG/DL (ref 39–259)
ESTIMATED AVERAGE GLUCOSE: 167 MG/DL (ref 70–126)
GFR ESTIMATION: 55.3 ML/MIN/1.73M2
GLOBULIN: 2.5 G/DL (ref 1.5–4.5)
GLUCOSE: 159 MG/DL (ref 82–115)
HBA1C MFR BLD: 7.4 % (ref 4–5.6)
MICROALBUMIN QUANT: <1.2 MG/DL (ref 0–2)
MICROALBUMIN/CREATININE RATIO: NORMAL UG/MG (ref 0–30)
POTASSIUM: 4.9 MMOL/L (ref 3.5–5.1)
PROT SNV-MCNC: 6.9 G/DL (ref 6.4–8.3)
SODIUM: 142 MMOL/L (ref 136–145)
UREA NITROGEN (BUN): 22.2 MG/DL (ref 8–23)

## 2025-08-26 PROCEDURE — 99214 OFFICE O/P EST MOD 30 MIN: CPT | Mod: S$GLB,,, | Performed by: INTERNAL MEDICINE

## 2025-08-26 RX ORDER — AMLODIPINE BESYLATE 5 MG/1
5 TABLET ORAL DAILY
Qty: 90 TABLET | Refills: 1 | Status: SHIPPED | OUTPATIENT
Start: 2025-08-26

## 2025-08-28 ENCOUNTER — RESULTS FOLLOW-UP (OUTPATIENT)
Facility: CLINIC | Age: 65
End: 2025-08-28
Payer: MEDICARE

## 2025-08-29 ENCOUNTER — CLINICAL SUPPORT (OUTPATIENT)
Dept: UROLOGY | Facility: CLINIC | Age: 65
End: 2025-08-29
Payer: MEDICARE

## 2025-08-29 ENCOUNTER — TELEPHONE (OUTPATIENT)
Dept: UROLOGY | Facility: CLINIC | Age: 65
End: 2025-08-29

## 2025-08-29 DIAGNOSIS — R97.20 ELEVATED PSA: Primary | ICD-10-CM
